# Patient Record
Sex: FEMALE | Race: WHITE | Employment: FULL TIME | URBAN - METROPOLITAN AREA
[De-identification: names, ages, dates, MRNs, and addresses within clinical notes are randomized per-mention and may not be internally consistent; named-entity substitution may affect disease eponyms.]

---

## 2020-06-09 ENCOUNTER — PREP FOR PROCEDURE (OUTPATIENT)
Dept: OBGYN | Age: 50
End: 2020-06-09

## 2020-06-09 RX ORDER — SODIUM CHLORIDE 0.9 % (FLUSH) 0.9 %
10 SYRINGE (ML) INJECTION PRN
Status: CANCELLED | OUTPATIENT
Start: 2020-06-09

## 2020-06-09 RX ORDER — SODIUM CHLORIDE 0.9 % (FLUSH) 0.9 %
10 SYRINGE (ML) INJECTION EVERY 12 HOURS SCHEDULED
Status: CANCELLED | OUTPATIENT
Start: 2020-06-09

## 2020-06-09 NOTE — H&P (VIEW-ONLY)
100 mcg-25 mcg/dose powder for inhalation inhale 1 puff by inhalation route  every day at the same time each day //   Y   ibuprofen 800 mg tablet take 1 tablet by oral route 2 times every day with food as needed //   Y   Flonase Allergy Relief 50 mcg/actuation nasal spray,suspension spray 1 - 2 spray by intranasal route  every day in each nostril as needed as needed 04/10/2020   N   Claritin 10 mg tablet take 1 tablet by oral route  every day as needed //   Y       Allergies:  Ingredient Reaction (Severity) Medication Name Comment   AMOXICILLIN TRIHYDRATE  Augmentin    POTASSIUM CLAVULANATE  Augmentin        Family History  (Detailed)  Relationship Family Member Name  Age at Death Condition Onset Age Cause of Death   Grandmother    Heart disease  N   Grandmother    Cancer, lung  N   Mother    Hypertension  N   Mother    High Cholesterol  N   Mother    Heart disease  N   Sister    Allergies  N   Sister    Migraines  N     Social History:  (Detailed)  Preferred language is English. 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 Tobacco use status: Never smoked tobacco.  Smoking status: Never smoker. VAPING USE  Screened for vaping? Yes  Status: Not a current user    TOBACCO/VAPING EXPOSURE  There is passive smoke exposure. ALCOHOL  There is no history of alcohol use. CAFFEINE  The patient uses caffeine: tea and soda. - 16 oz a day. LIFESTYLE  does not exercise activity level. DIET  Fair. The patient reports there are animals in the home. PETS   dogs. The patient cleans up after the animal(s). SLEEP PATTERNS  Patient has no changes to sleep patterns. Uatsdin/SPIRITUAL  Practices Denominational. Doesn't have spiritual beliefs. required 0     SDOH    Intervention not needed 0       Assessment/Plan  # Detail Type Description    1. Assessment Abnormal uterine bleeding (AUB) (N93.9). Impression Failing ablation vs submucosal fibroid and cannot r/o endometrial hyperplasia with thickened endometrium. Likely perimenopausal small ovarian cysts. Recommend tissue sample and reviewed EMB vs hysteroscopy and can also evaluate/remove fibroid and pt prefers hysteroscopy. Pending result can consider hormonal treatment until menopause. Risks of bleeding, infection, damage to surrounding organs, perforation, DVT, ileus reviewed. Questions answered and postop instructions reviewed. .         2. Assessment Body mass index (BMI) 45.0-49.9, adult (B62.02). Medications (Added, Continued or Stopped today):  Start Date Medication Directions PRN Status PRN Reason Instruction Stop Date    Breo Ellipta 100 mcg-25 mcg/dose powder for inhalation inhale 1 puff by inhalation route  every day at the same time each day N       Claritin 10 mg tablet take 1 tablet by oral route  every day as needed Y      04/10/2020 Flonase Allergy Relief 50 mcg/actuation nasal spray,suspension spray 1 - 2 spray by intranasal route  every day in each nostril as needed as needed Y       hydrochlorothiazide 12.5 mg capsule take 1 capsule by oral route  every day N       ibuprofen 800 mg tablet take 1 tablet by oral route 2 times every day with food as needed Y      10/02/2014 venlafaxine ER 37.5 mg capsule,extended release 24 hr take 1 capsule by oral route  every day with food N            Active Patient Care Team Members    Name Contact Agency Type Support Role Relationship Active Date Inactive Date Specialty   .  Non HSO Provider   Patient provider PCP      Luther Sung    Spouse          Provider:   Wilbur Johnson MD, Jessica Bowman  06/09/2020 4:15 PM   Document generated by:  Felicita Talbert 06/09/2020 04:15 PM  [facility]  [address]

## 2020-06-09 NOTE — PROGRESS NOTES
Obstructive Sleep Apnea (JULIET) Screening     Patient:  Hadye Quach    YOB: 1970      Medical Record #:  [id number]                     Date:  6/9/2020     1. Are you a loud and/or regular snorer? [x]  Yes       [] No    2. Have you been observed to gasp or stop breathing during sleep? []  Yes       [x] No    3. Do you feel tired or groggy upon awakening or do you awaken with a headache? [x]  Yes       [] No    4. Are you often tired or fatigued during the wake time hours? [x]  Yes       [] No    5. Do you fall asleep sitting, reading, watching TV or driving? []  Yes       [x] No    6. Do you often have problems with memory or concentration? []  Yes       [x] No    **If patient's score is ? 3 they are considered high risk for JULIET. An Anesthesia provider will evaluate the patient and develop a plan of care the day of surgery. Note:  If the patient's BMI is more than 35 kg m¯² , has neck circumference > 40 cm, and/or high blood pressure the risk is greater (© American Sleep Apnea Association, 2006).

## 2020-06-12 ENCOUNTER — OFFICE VISIT (OUTPATIENT)
Dept: PRIMARY CARE CLINIC | Age: 50
End: 2020-06-12

## 2020-06-13 LAB
SARS-COV-2: NOT DETECTED
SOURCE: NORMAL

## 2020-06-15 ENCOUNTER — ANESTHESIA EVENT (OUTPATIENT)
Dept: OPERATING ROOM | Age: 50
End: 2020-06-15
Payer: COMMERCIAL

## 2020-06-16 ENCOUNTER — HOSPITAL ENCOUNTER (OUTPATIENT)
Age: 50
Setting detail: OUTPATIENT SURGERY
Discharge: HOME | End: 2020-06-16
Attending: OBSTETRICS & GYNECOLOGY | Admitting: OBSTETRICS & GYNECOLOGY
Payer: COMMERCIAL

## 2020-06-16 ENCOUNTER — ANESTHESIA (OUTPATIENT)
Dept: OPERATING ROOM | Age: 50
End: 2020-06-16
Payer: COMMERCIAL

## 2020-06-16 VITALS
DIASTOLIC BLOOD PRESSURE: 74 MMHG | SYSTOLIC BLOOD PRESSURE: 124 MMHG | HEART RATE: 56 BPM | HEIGHT: 62 IN | TEMPERATURE: 97.2 F | RESPIRATION RATE: 14 BRPM | WEIGHT: 244 LBS | OXYGEN SATURATION: 99 % | BODY MASS INDEX: 44.9 KG/M2

## 2020-06-16 VITALS
DIASTOLIC BLOOD PRESSURE: 55 MMHG | RESPIRATION RATE: 17 BRPM | SYSTOLIC BLOOD PRESSURE: 102 MMHG | OXYGEN SATURATION: 99 %

## 2020-06-16 LAB
HCT VFR BLD CALC: 39.2 % (ref 36–48)
HEMOGLOBIN: 13.1 G/DL (ref 12–16)
MCH RBC QN AUTO: 26.7 PG (ref 26–34)
MCHC RBC AUTO-ENTMCNC: 33.4 G/DL (ref 31–36)
MCV RBC AUTO: 79.9 FL (ref 80–100)
PDW BLD-RTO: 14.5 % (ref 12.4–15.4)
PLATELET # BLD: 224 K/UL (ref 135–450)
PMV BLD AUTO: 8.4 FL (ref 5–10.5)
PREGNANCY, URINE: NEGATIVE
RBC # BLD: 4.91 M/UL (ref 4–5.2)
WBC # BLD: 8.5 K/UL (ref 4–11)

## 2020-06-16 PROCEDURE — 84703 CHORIONIC GONADOTROPIN ASSAY: CPT

## 2020-06-16 PROCEDURE — 85027 COMPLETE CBC AUTOMATED: CPT

## 2020-06-16 PROCEDURE — 7100000010 HC PHASE II RECOVERY - FIRST 15 MIN: Performed by: OBSTETRICS & GYNECOLOGY

## 2020-06-16 PROCEDURE — 3700000000 HC ANESTHESIA ATTENDED CARE: Performed by: OBSTETRICS & GYNECOLOGY

## 2020-06-16 PROCEDURE — 3600000013 HC SURGERY LEVEL 3 ADDTL 15MIN: Performed by: OBSTETRICS & GYNECOLOGY

## 2020-06-16 PROCEDURE — 88305 TISSUE EXAM BY PATHOLOGIST: CPT

## 2020-06-16 PROCEDURE — 7100000011 HC PHASE II RECOVERY - ADDTL 15 MIN: Performed by: OBSTETRICS & GYNECOLOGY

## 2020-06-16 PROCEDURE — 6360000002 HC RX W HCPCS: Performed by: NURSE ANESTHETIST, CERTIFIED REGISTERED

## 2020-06-16 PROCEDURE — 3600000003 HC SURGERY LEVEL 3 BASE: Performed by: OBSTETRICS & GYNECOLOGY

## 2020-06-16 PROCEDURE — 7100000000 HC PACU RECOVERY - FIRST 15 MIN: Performed by: OBSTETRICS & GYNECOLOGY

## 2020-06-16 PROCEDURE — 2580000003 HC RX 258: Performed by: OBSTETRICS & GYNECOLOGY

## 2020-06-16 PROCEDURE — 2500000003 HC RX 250 WO HCPCS: Performed by: NURSE ANESTHETIST, CERTIFIED REGISTERED

## 2020-06-16 PROCEDURE — 3700000001 HC ADD 15 MINUTES (ANESTHESIA): Performed by: OBSTETRICS & GYNECOLOGY

## 2020-06-16 PROCEDURE — 2720000010 HC SURG SUPPLY STERILE: Performed by: OBSTETRICS & GYNECOLOGY

## 2020-06-16 PROCEDURE — 2580000003 HC RX 258: Performed by: ANESTHESIOLOGY

## 2020-06-16 PROCEDURE — 2709999900 HC NON-CHARGEABLE SUPPLY: Performed by: OBSTETRICS & GYNECOLOGY

## 2020-06-16 RX ORDER — ONDANSETRON 2 MG/ML
INJECTION INTRAMUSCULAR; INTRAVENOUS PRN
Status: DISCONTINUED | OUTPATIENT
Start: 2020-06-16 | End: 2020-06-16 | Stop reason: SDUPTHER

## 2020-06-16 RX ORDER — OXYCODONE HYDROCHLORIDE AND ACETAMINOPHEN 5; 325 MG/1; MG/1
1 TABLET ORAL PRN
Status: DISCONTINUED | OUTPATIENT
Start: 2020-06-16 | End: 2020-06-16 | Stop reason: HOSPADM

## 2020-06-16 RX ORDER — LIDOCAINE HYDROCHLORIDE 10 MG/ML
0.3 INJECTION, SOLUTION EPIDURAL; INFILTRATION; INTRACAUDAL; PERINEURAL
Status: DISCONTINUED | OUTPATIENT
Start: 2020-06-16 | End: 2020-06-16 | Stop reason: HOSPADM

## 2020-06-16 RX ORDER — OXYCODONE HYDROCHLORIDE AND ACETAMINOPHEN 5; 325 MG/1; MG/1
2 TABLET ORAL PRN
Status: DISCONTINUED | OUTPATIENT
Start: 2020-06-16 | End: 2020-06-16 | Stop reason: HOSPADM

## 2020-06-16 RX ORDER — DEXAMETHASONE SODIUM PHOSPHATE 4 MG/ML
INJECTION, SOLUTION INTRA-ARTICULAR; INTRALESIONAL; INTRAMUSCULAR; INTRAVENOUS; SOFT TISSUE PRN
Status: DISCONTINUED | OUTPATIENT
Start: 2020-06-16 | End: 2020-06-16 | Stop reason: SDUPTHER

## 2020-06-16 RX ORDER — SODIUM CHLORIDE 0.9 % (FLUSH) 0.9 %
10 SYRINGE (ML) INJECTION PRN
Status: DISCONTINUED | OUTPATIENT
Start: 2020-06-16 | End: 2020-06-16 | Stop reason: HOSPADM

## 2020-06-16 RX ORDER — LIDOCAINE HYDROCHLORIDE 20 MG/ML
INJECTION, SOLUTION INFILTRATION; PERINEURAL PRN
Status: DISCONTINUED | OUTPATIENT
Start: 2020-06-16 | End: 2020-06-16 | Stop reason: SDUPTHER

## 2020-06-16 RX ORDER — ONDANSETRON 2 MG/ML
4 INJECTION INTRAMUSCULAR; INTRAVENOUS EVERY 30 MIN PRN
Status: DISCONTINUED | OUTPATIENT
Start: 2020-06-16 | End: 2020-06-16 | Stop reason: HOSPADM

## 2020-06-16 RX ORDER — SODIUM CHLORIDE, SODIUM LACTATE, POTASSIUM CHLORIDE, AND CALCIUM CHLORIDE .6; .31; .03; .02 G/100ML; G/100ML; G/100ML; G/100ML
IRRIGANT IRRIGATION PRN
Status: DISCONTINUED | OUTPATIENT
Start: 2020-06-16 | End: 2020-06-16 | Stop reason: ALTCHOICE

## 2020-06-16 RX ORDER — HYDRALAZINE HYDROCHLORIDE 20 MG/ML
5 INJECTION INTRAMUSCULAR; INTRAVENOUS EVERY 30 MIN PRN
Status: DISCONTINUED | OUTPATIENT
Start: 2020-06-16 | End: 2020-06-16 | Stop reason: HOSPADM

## 2020-06-16 RX ORDER — PROPOFOL 10 MG/ML
INJECTION, EMULSION INTRAVENOUS PRN
Status: DISCONTINUED | OUTPATIENT
Start: 2020-06-16 | End: 2020-06-16 | Stop reason: SDUPTHER

## 2020-06-16 RX ORDER — DIPHENHYDRAMINE HYDROCHLORIDE 50 MG/ML
6.25 INJECTION INTRAMUSCULAR; INTRAVENOUS
Status: DISCONTINUED | OUTPATIENT
Start: 2020-06-16 | End: 2020-06-16 | Stop reason: HOSPADM

## 2020-06-16 RX ORDER — LABETALOL HYDROCHLORIDE 5 MG/ML
5 INJECTION, SOLUTION INTRAVENOUS
Status: DISCONTINUED | OUTPATIENT
Start: 2020-06-16 | End: 2020-06-16 | Stop reason: HOSPADM

## 2020-06-16 RX ORDER — KETOROLAC TROMETHAMINE 30 MG/ML
INJECTION, SOLUTION INTRAMUSCULAR; INTRAVENOUS PRN
Status: DISCONTINUED | OUTPATIENT
Start: 2020-06-16 | End: 2020-06-16 | Stop reason: SDUPTHER

## 2020-06-16 RX ORDER — SODIUM CHLORIDE 0.9 % (FLUSH) 0.9 %
10 SYRINGE (ML) INJECTION EVERY 12 HOURS SCHEDULED
Status: DISCONTINUED | OUTPATIENT
Start: 2020-06-16 | End: 2020-06-16 | Stop reason: HOSPADM

## 2020-06-16 RX ORDER — SODIUM CHLORIDE, SODIUM LACTATE, POTASSIUM CHLORIDE, CALCIUM CHLORIDE 600; 310; 30; 20 MG/100ML; MG/100ML; MG/100ML; MG/100ML
INJECTION, SOLUTION INTRAVENOUS CONTINUOUS
Status: DISCONTINUED | OUTPATIENT
Start: 2020-06-16 | End: 2020-06-16 | Stop reason: HOSPADM

## 2020-06-16 RX ORDER — MEPERIDINE HYDROCHLORIDE 50 MG/ML
12.5 INJECTION INTRAMUSCULAR; INTRAVENOUS; SUBCUTANEOUS EVERY 5 MIN PRN
Status: DISCONTINUED | OUTPATIENT
Start: 2020-06-16 | End: 2020-06-16 | Stop reason: HOSPADM

## 2020-06-16 RX ORDER — FENTANYL CITRATE 50 UG/ML
INJECTION, SOLUTION INTRAMUSCULAR; INTRAVENOUS PRN
Status: DISCONTINUED | OUTPATIENT
Start: 2020-06-16 | End: 2020-06-16 | Stop reason: SDUPTHER

## 2020-06-16 RX ORDER — MIDAZOLAM HYDROCHLORIDE 1 MG/ML
INJECTION INTRAMUSCULAR; INTRAVENOUS PRN
Status: DISCONTINUED | OUTPATIENT
Start: 2020-06-16 | End: 2020-06-16 | Stop reason: SDUPTHER

## 2020-06-16 RX ADMIN — DEXAMETHASONE SODIUM PHOSPHATE 8 MG: 4 INJECTION, SOLUTION INTRAMUSCULAR; INTRAVENOUS at 11:21

## 2020-06-16 RX ADMIN — MIDAZOLAM HYDROCHLORIDE 2 MG: 2 INJECTION, SOLUTION INTRAMUSCULAR; INTRAVENOUS at 11:12

## 2020-06-16 RX ADMIN — LIDOCAINE HYDROCHLORIDE 60 MG: 20 INJECTION, SOLUTION INFILTRATION; PERINEURAL at 11:15

## 2020-06-16 RX ADMIN — FENTANYL CITRATE 50 MCG: 50 INJECTION INTRAMUSCULAR; INTRAVENOUS at 11:15

## 2020-06-16 RX ADMIN — PROPOFOL 200 MG: 10 INJECTION, EMULSION INTRAVENOUS at 11:15

## 2020-06-16 RX ADMIN — FENTANYL CITRATE 25 MCG: 50 INJECTION INTRAMUSCULAR; INTRAVENOUS at 11:26

## 2020-06-16 RX ADMIN — KETOROLAC TROMETHAMINE 30 MG: 30 INJECTION, SOLUTION INTRAMUSCULAR; INTRAVENOUS at 11:36

## 2020-06-16 RX ADMIN — ONDANSETRON 4 MG: 2 INJECTION INTRAMUSCULAR; INTRAVENOUS at 11:21

## 2020-06-16 RX ADMIN — FENTANYL CITRATE 25 MCG: 50 INJECTION INTRAMUSCULAR; INTRAVENOUS at 11:33

## 2020-06-16 RX ADMIN — SODIUM CHLORIDE, POTASSIUM CHLORIDE, SODIUM LACTATE AND CALCIUM CHLORIDE: 600; 310; 30; 20 INJECTION, SOLUTION INTRAVENOUS at 10:32

## 2020-06-16 ASSESSMENT — PAIN - FUNCTIONAL ASSESSMENT: PAIN_FUNCTIONAL_ASSESSMENT: 0-10

## 2020-06-16 ASSESSMENT — PULMONARY FUNCTION TESTS
PIF_VALUE: 17
PIF_VALUE: 11
PIF_VALUE: 2
PIF_VALUE: 11
PIF_VALUE: 10
PIF_VALUE: 15
PIF_VALUE: 11
PIF_VALUE: 1
PIF_VALUE: 11
PIF_VALUE: 15
PIF_VALUE: 1
PIF_VALUE: 10
PIF_VALUE: 15
PIF_VALUE: 11
PIF_VALUE: 11
PIF_VALUE: 10
PIF_VALUE: 11
PIF_VALUE: 19
PIF_VALUE: 14
PIF_VALUE: 11
PIF_VALUE: 11
PIF_VALUE: 15
PIF_VALUE: 5
PIF_VALUE: 16
PIF_VALUE: 11
PIF_VALUE: 11
PIF_VALUE: 15
PIF_VALUE: 11
PIF_VALUE: 1

## 2020-06-16 ASSESSMENT — PAIN SCALES - GENERAL
PAINLEVEL_OUTOF10: 0

## 2020-06-16 NOTE — INTERVAL H&P NOTE
Update History & Physical    The patient's History and Physical of June 9, 2020 was reviewed with the patient and I examined the patient. There was no change. The surgical site was confirmed by the patient and me. Plan: The risks, benefits, expected outcome, and alternative to the recommended procedure have been discussed with the patient. Patient understands and wants to proceed with the procedure.      Electronically signed by Dennise Britt MD on 6/16/2020 at 10:15 AM

## 2020-06-16 NOTE — OP NOTE
the fibroid and polyps. The MyoSure REACH was used to  completely excise the submucosal fibroid as well as the polyps and  global sampling of the endometrium. The hysteroscope was removed and  endometrial curettings were performed. The uterus sounded to 10 cm. The instruments were then removed with good hemostasis. The patient  tolerated the procedure well. Sponge, lap, and needle counts were  correct x2. I performed the procedure.         Alex Mix MD    D: 06/16/2020 11:49:50       T: 06/16/2020 13:49:36     [email or web address]  Job#: [id number]     Doc#: [id number]    CC:

## 2020-08-17 ENCOUNTER — PREP FOR PROCEDURE (OUTPATIENT)
Dept: OBGYN | Age: 50
End: 2020-08-17

## 2020-08-17 ENCOUNTER — ANESTHESIA EVENT (OUTPATIENT)
Dept: OPERATING ROOM | Age: 50
End: 2020-08-17
Payer: COMMERCIAL

## 2020-08-17 RX ORDER — SODIUM CHLORIDE 0.9 % (FLUSH) 0.9 %
10 SYRINGE (ML) INJECTION EVERY 12 HOURS SCHEDULED
Status: CANCELLED | OUTPATIENT
Start: 2020-08-17

## 2020-08-17 RX ORDER — SODIUM CHLORIDE 0.9 % (FLUSH) 0.9 %
10 SYRINGE (ML) INJECTION PRN
Status: CANCELLED | OUTPATIENT
Start: 2020-08-17

## 2020-08-17 NOTE — H&P (VIEW-ONLY)
>      PATIENT:  Kayla Mcarthur  YOB: 1970  DATE:   2020 3:45 PM   VISIT TYPE: Office Visit - GYN        This 48year old female presents for Hyperplasia. History of Present Illness:  1. Hyperplasia   Pt had ablation in  and was having rare light bleeding but 2-3 months ago began to have persistent bleeding and had some heavy menstrual like days. Has had some recent heavy bleeding. US  showed EMS 16 mm and multiple small fibroids, one 1.2 cm likely submucosal, bilateral small ovarian cysts. Pt states hot flashes have increased worse at night. Mild cramping. Underwent Hysteroscopy myomectomy  with hyperplasia without atypia on final pathology. Screening Tools  Other Screenings:  Encounter Date Performed Date Instrument Score Severity/Interpretation MDD Classification   2020 Patient Health Questionnaire (PHQ-2) 0 Further testing is not required    2020 CAGE-AID Alcohol and Drug Screening 0 None    2020 SDOH 0 Intervention not needed      CAGE ALCOHOL SCREENING TEST      Felt need to cut down drinking? No       Ever felt annoyed by criticism of drinking? No       Had guilty feelings about drinking? No       Ever take morning eye-opener? No       Performed Date:       Score: 0        PAST MEDICAL/SURGICAL HISTORY   (Detailed)    Disease/disorder Onset Date Management Date Comments    2018 back surgery     Back surgery 2018      Lasik 2002      episodes of fainting       Abnormal uterine bleeding  Hysteroscopy myomectomy, D&C 2020    Gallbladder disease       heavy menses  ablation-      Hypertension         GYNECOLOGIC HISTORY:  Patient is perimenopausal. Last menses was 2020. Menarche:  15 y.o. (onset)  Patient has not had a hysterectomy. Date of last PAP: 2016. Date of last mammogram: 2018. OBSTETRIC HISTORY:  Not currently pregnant.    :3.      PARITY: Term:3.  Pre-Term: 0. : 0. Living: 3. SVDs: 3. Medications (active prior to today)  Medication Name Sig Description Start Date Stop Date Refilled Rx Elsewhere   venlafaxine ER 37.5 mg capsule,extended release 24 hr take 1 capsule by oral route  every day with food 10/02/2014   N   hydrochlorothiazide 12.5 mg capsule take 1 capsule by oral route  every day //   Y   Breo Ellipta 100 mcg-25 mcg/dose powder for inhalation inhale 1 puff by inhalation route  every day at the same time each day //   Y   ibuprofen 800 mg tablet take 1 tablet by oral route 2 times every day with food as needed //   Y   Flonase Allergy Relief 50 mcg/actuation nasal spray,suspension spray 1 - 2 spray by intranasal route  every day in each nostril as needed as needed 04/10/2020   N   Claritin 10 mg tablet take 1 tablet by oral route  every day as needed //   Y     Medication Reconciliation  Medications reconciled today.   Medication Reviewed  Adherence Medication Name Sig Desc Elsewhere Status   taking as directed Breo Ellipta 100 mcg-25 mcg/dose powder for inhalation inhale 1 puff by inhalation route  every day at the same time each day Y Verified   taking as directed Claritin 10 mg tablet take 1 tablet by oral route  every day as needed Y Verified   taking as directed Flonase Allergy Relief 50 mcg/actuation nasal spray,suspension spray 1 - 2 spray by intranasal route  every day in each nostril as needed as needed N Verified   taking as directed hydrochlorothiazide 12.5 mg capsule take 1 capsule by oral route  every day Y Verified   prn use ibuprofen 800 mg tablet take 1 tablet by oral route 2 times every day with food as needed Y Verified   taken as directed venlafaxine ER 37.5 mg capsule,extended release 24 hr take 1 capsule by oral route  every day with food N Verified     Allergies:  Ingredient Reaction (Severity) Medication Name Comment   AMOXICILLIN TRIHYDRATE  Augmentin    POTASSIUM CLAVULANATE  Augmentin Family History  (Detailed)  Relationship Family Member Name  Age at Death Condition Onset Age Cause of Death   Grandmother    Heart disease  N   Grandmother    Cancer, lung  N   Mother    Hypertension  N   Mother    High Cholesterol  N   Mother    Heart disease  N   Sister    Allergies  N   Sister    Migraines  N     Social History:  (Detailed)  Tobacco use reviewed. Preferred language is [place]. 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 Tobacco use status: Never smoked tobacco.  Smoking status: Never smoker. SMOKING STATUS  Type Smoking Status Usage Per Day Years Used Pack Years Total Pack Years    Never smoker         VAPING USE  Screened for vaping? Yes  Status: Not a current user    TOBACCO/VAPING EXPOSURE  There is passive smoke exposure. ALCOHOL  There is no history of alcohol use. CAFFEINE  The patient uses caffeine: tea and soda. - 16 oz a day. LIFESTYLE  does not exercise activity level. DIET  Fair. The patient reports there are animals in the home. PETS   dogs. The patient cleans up after the animal(s). SLEEP PATTERNS  Patient has no changes to sleep patterns. Yazdanism/SPIRITUAL  Practices Confucianism. Doesn't have spiritual beliefs. Mandaen/spirituality is an important part of the patient's life.  EXPERIENCE  Patient has no  experience. Review of Systems  System Neg/Pos Details   Constitutional Negative Chills/rigors, Fever and Generalized weakness. ENMT Negative Dysphagia and Epistaxis. Eyes Negative Burning eyes, Eye discharge, Eye redness and Vision changes. Respiratory Negative Dyspnea, Hemoptysis and Wheezing. Cardio Negative Chest pain and Irregular heartbeat/palpitations. GI Negative Abdominal pain, Change in bowel habits and Nausea.  Negative Change in urine color, Dysuria and Hematuria. Endocrine Negative Change in sleep/awake pattern. Neuro Negative Aphasia, Dizziness and Gait disturbance. Integumentary Negative Photosensitivity and Rash. MS Negative Bone/joint symptoms, Muscle weakness and Numbness in extremity. Hema/Lymph Negative Easy bleeding and Lymphadenopathy. Reproductive Positive Menarche age (Menarche age was 15), The patient is victoria-menopausal.   Reproductive Negative Breast discharge, Breast lumps and Breast pain. Vital Signs     Last menses was 07/30/2020. Time BP mm/Hg Pulse /min Resp /min Temp F Ht ft Ht in Ht cm Wt lb Wt kg BMI kg/m2 BSA m2 O2 Sat%   4:05 /76 77 14 97.4 5.0 1.50 156.21 246.60 111. 856 45.84 2.20 98     Measured By  Time Measured by   4:05 PM Hetal Mccallum MA     Physical Exam  Exam Findings Details   Constitutional Normal Well developed. Respiratory Normal Auscultation - Normal.   Cardiovascular Normal Regular rhythm. No murmurs, gallops, or rubs. Abdomen Normal Anterior palpation -  No rebound. No abdominal tenderness. No hernia. Skin Normal Inspection - Normal.   Psychiatric Normal Oriented to time, place, person and situation. Appropriate mood and affect. Completed Orders (This Visit)  Order Details Reason Side Interpretation Result Initial Treatment Date Region   Giving encouragement to exercise          Lifestyle education regarding diet          Patient Health Questionnaire (PHQ-2)    Further testing is not required 0     CAGE-AID Alcohol and Drug Screening    None 0     SDOH    Intervention not needed 0       Assessment/Plan  # Detail Type Description    1. Assessment Endometrial hyperplasia (N85.00). Impression Diagnosis and progression reviewed and discussed hormonal treatment options vs hysterectomy and pt desires hysterectomy.  Ovarian retention vs removal benefits and risks discussed and pt would like to retain. Will get record of medical clearance with PCP. Risks of bleeding, infection, damage to surrounding organs, DVT, ileus reviewed. Questions answered and postop instructions reviewed. .         2. Assessment Body mass index (BMI) 45.0-49.9, adult (S10.73). 3. Assessment Dietary counseling and surveillance (Z71.3). Plan Orders Today's instructions / counseling include(s) Lifestyle education regarding diet. Giving encouragement to exercise                        Medications (Added, Continued or Stopped today):  Start Date Medication Directions PRN Status PRN Reason Instruction Stop Date    Breo Ellipta 100 mcg-25 mcg/dose powder for inhalation inhale 1 puff by inhalation route  every day at the same time each day N       Claritin 10 mg tablet take 1 tablet by oral route  every day as needed Y      04/10/2020 Flonase Allergy Relief 50 mcg/actuation nasal spray,suspension spray 1 - 2 spray by intranasal route  every day in each nostril as needed as needed Y       hydrochlorothiazide 12.5 mg capsule take 1 capsule by oral route  every day N       ibuprofen 800 mg tablet take 1 tablet by oral route 2 times every day with food as needed Y      10/02/2014 venlafaxine ER 37.5 mg capsule,extended release 24 hr take 1 capsule by oral route  every day with food N            The patient was checked out at 4:55 PM by Romario Juan MA. Active Patient Care Team Members    Name Contact Agency Type Support Role Relationship Active Date Inactive Date Specialty   .  Non HSO Provider   Patient provider PCP      Feliciano Lin    Spouse          Provider:   Krista Arenas MD, Thomas Ferrara  08/17/2020 5:20 PM   Document generated by:  Shannen Servin 08/17/2020 05:20 PM  [facility]  [address]   Phone: [phone]  Fax: [phone]      Electronically signed by Shannen Servin MD on 08/17/2020 05:22 PM

## 2020-08-18 ENCOUNTER — HOSPITAL ENCOUNTER (OUTPATIENT)
Age: 50
Discharge: HOME | End: 2020-08-18
Payer: COMMERCIAL

## 2020-08-18 PROCEDURE — U0003 INFECTIOUS AGENT DETECTION BY NUCLEIC ACID (DNA OR RNA); SEVERE ACUTE RESPIRATORY SYNDROME CORONAVIRUS 2 (SARS-COV-2) (CORONAVIRUS DISEASE [COVID-19]), AMPLIFIED PROBE TECHNIQUE, MAKING USE OF HIGH THROUGHPUT TECHNOLOGIES AS DESCRIBED BY CMS-2020-01-R: HCPCS

## 2020-08-18 NOTE — PROGRESS NOTES
PAT completed with patient orders in Epic placed by MD, Covid testing on 8/18/2020, pt aware of 0600 arrival time NPO after midnight the night prior to surgery may take medications with sip of water aware she needs to bring  with her and denies any current illness with herself or within the home. [place] Brandie

## 2020-08-18 NOTE — PRE-PROCEDURE INSTRUCTIONS
.1.  Do not eat or drink anything after 12 midnight prior to surgery. This includes no water, chewing gum or mints. 2.  Take the following pills with a small sip of water on the morning of surgery 8/24/2020.  3.  Aspirin, Ibuprofen, Advil, Naproxen, Vitamin E and other Anti-inflammatory products should be stopped for 5 days before surgery or as directed by your physician. 4.  Check with your doctor regarding stopping Plavix, Coumadin, Lovenox, Fragmin or other blood thinners. 5.  Do not smoke and do not drink alcoholic beverages 24 hours prior to surgery. This includes NA Beer. 6.  You may brush your teeth and gargle the morning of surgery. DO NOT SWALLOW WATER.  7.  You MUST make arrangements for a responsible adult to take you home after your surgery. You will not be allowed to leave alone or drive yourself home. It is strongly suggested someone stay with you the first 24 hours. Your surgery will be cancelled if you do not have a ride home. 8.  A parent/legal guardian must accompany a child scheduled for surgery and plan to stay at the hospital until the child is discharged. Please do not bring other children with you. 9.  Please wear simple, loose fitting clothing to the hospital.  Juan Porch not bring valuables ( money, credit cards, checkbooks, etc.)  Do not wear any makeup (including no eye makeup) or nail polish on your fingers or toes. 10.  Do not wear any jewelry or piercing on the day of surgery. All body piercing jewelry must be removed. 11.  If you have dentures, they will be removed before going to the OR; we will provide you a container. If you wear contact lenses or glasses, they will be removed; please bring a case for them. 12.  Please see your family doctor/pediatrician for a history & physical and/or concerning medications. Bring any test results/reports from your physician's office the day of surgery.     13.  Remember to bring Blood Bank Bracelet to the hospital on the day of

## 2020-08-19 LAB — SARS-COV-2, NAA: NOT DETECTED

## 2020-08-21 NOTE — ANESTHESIA PRE PROCEDURE
Department of Anesthesiology  Preprocedure Note       Name:  Dorothy Martin   Age:  48 y.o.  :  1970                                          MRN:  [id number]         Date:  2020      Surgeon: Iza Tobar):  Юлия Frank MD    Procedure:  ROBOTIC ASSISTED LAPAROSCOPIC HYSTERECTOMY    HPI:  The patient is a 80-year-old with previous endometrial ablation in . She was having rare light bleeding but 2-3 months ago began to have persistent bleeding and had some heavy menstrual like days. Has had some recent heavy bleeding. US  showed EMS 16 mm and multiple small fibroids, one 1.2 cm likely submucosal, bilateral small ovarian cysts. Pt states hot flashes have increased worse at night. Mild cramping. Underwent Hysteroscopy myomectomy  with hyperplasia without atypia on final pathology. Medications prior to admission:    Fluticasone Furoate-Vilanterol (BREO ELLIPTA IN) Inhale into the lungs daily   Fluticasone Propionate (FLONASE NA) 2 sprays by Nasal route daily   LYSINE PO Take by mouth as needed   hydrochlorothiazide (HYDRODIURIL) 25 MG tablet Take 25 mg by mouth daily    ibuprofen (ADVIL;MOTRIN) 800 MG tablet Take 800 mg by mouth as needed    venlafaxine (EFFEXOR) 37.5 MG tablet Take 37.5 mg by mouth daily.      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    Localized osteoarthrosis, lower leg M17.10     Past Medical History:     Asthma     Chest pain     non cardiac    Hypertension     Localized osteoarthrosis, lower leg 2016    Prolonged emergence from general anesthesia     Stress incontinence     Wears glasses     for driving at night     Past Surgical History:     BACK SURGERY      CHOLECYSTECTOMY      CYSTOSCOPY      DILATION AND CURETTAGE OF UTERUS N/A 2020 VIDEO HYSTEROSCOPY DILATATION AND CURETTAGE, POSSIBLE MYOSURE -JULIET=3- performed by Rosa Maria Akhtar MD at [address]  11/25/14    SKIN BIOPSY  8/29/2014    scalp lesion     Social History:    Tobacco Use    Smoking status: Never Smoker    Smokeless tobacco: Never Used   Substance Use Topics    Alcohol use: No     Alcohol/week: 0.0 standard drinks     Vital Signs (Current):    BP: 119/66  Pulse: 70    Resp: 18  SpO2: 96    Temp: 97 °F (36.1 °C)    Height: 5' 2.5\" (1.588 m)  (08/24/20)  Weight: 245 lb (111.1 kg)  (08/24/20)    BMI: 44.2            BP Readings from Last 3 Encounters:   06/16/20 (!) 102/55   06/16/20 124/74   05/09/16 145/75     NPO Status: >8 hrs                       BMI:   Wt Readings from Last 3 Encounters:   06/16/20 244 lb (110.7 kg)   05/09/16 250 lb (113.4 kg)   05/02/16 250 lb (113.4 kg)     Body mass index is 44.1 kg/m². CBC:    WBC 8.5 06/16/2020    HGB 13.1 06/16/2020    HCT 39.2 06/16/2020     06/16/2020     CMP:     10/05/2012    K 4.0 10/05/2012     10/05/2012    CO2 30 10/05/2012    BUN 15 10/05/2012    GFRAA >60 10/05/2012    GLUCOSE 102 10/05/2012    PROT 6.9 10/05/2012    CALCIUM 9.1 10/05/2012    BILITOT 0.90 10/05/2012    ALKPHOS 73 10/05/2012    AST 18 10/05/2012    ALT 19 17/20/0476     HCG (If Applicable): negative    COVID-19 Screening (If Applicable):     COVID19 NOT DETECTED 08/18/2020     Anesthesia Evaluation  Patient summary reviewed and Nursing notes reviewed no history of anesthetic complications:   Airway: Mallampati: II  TM distance: >3 FB   Neck ROM: full  Mouth opening: > = 3 FB Dental:          Pulmonary: breath sounds clear to auscultation  (+) asthma:     (-) recent URI, sleep apnea, wheezes and not a current smoker                          ROS comment: Patient is having an exam for JULIET   Cardiovascular:  Exercise tolerance: good (>4 METS),   (+) hypertension:, hyperlipidemia    (-) past MI, dysrhythmias,  angina and  VARGAS    ECG reviewed  Rhythm: regular  Rate: normal                 ROS comment: EKG:  3-  Intervals                 Axis            Rate:       67          P:            42  HI:       147          QRS:          39  QRSD:   94           T:            2  QT:       391                                      QTc:     413                                        Sinus rhythm     Neuro/Psych:      (-) seizures, TIA and CVA            ROS comment: L4/5 and T 12-L4 decompression GI/Hepatic/Renal:   (+) GERD: well controlled, morbid obesity     (-) hepatitis, liver disease and no renal disease       Endo/Other:    (+) : arthritis: OA., .    (-) diabetes mellitus, hypothyroidism               Abdominal:   (+) obese,         Vascular:     - DVT and PE. Anesthesia Plan      general     ASA 3     (TAP block if open procedure needed)  Induction: intravenous. MIPS: Prophylactic antiemetics administered. Anesthetic plan and risks discussed with patient. Plan discussed with CRNA.           Madison Leyva MD

## 2020-08-24 ENCOUNTER — HOSPITAL ENCOUNTER (OUTPATIENT)
Age: 50
Setting detail: OBSERVATION
Discharge: HOME | End: 2020-08-25
Attending: OBSTETRICS & GYNECOLOGY | Admitting: OBSTETRICS & GYNECOLOGY
Payer: COMMERCIAL

## 2020-08-24 ENCOUNTER — ANESTHESIA (OUTPATIENT)
Dept: OPERATING ROOM | Age: 50
End: 2020-08-24
Payer: COMMERCIAL

## 2020-08-24 VITALS
SYSTOLIC BLOOD PRESSURE: 117 MMHG | OXYGEN SATURATION: 100 % | DIASTOLIC BLOOD PRESSURE: 81 MMHG | RESPIRATION RATE: 21 BRPM

## 2020-08-24 PROBLEM — Z90.710 S/P HYSTERECTOMY: Status: ACTIVE | Noted: 2020-08-24

## 2020-08-24 LAB
ABO/RH: NORMAL
ANION GAP SERPL CALCULATED.3IONS-SCNC: 11 MMOL/L (ref 3–16)
ANION GAP SERPL CALCULATED.3IONS-SCNC: 7 MMOL/L (ref 3–16)
ANTIBODY SCREEN: NORMAL
BUN BLDV-MCNC: 14 MG/DL (ref 7–20)
BUN BLDV-MCNC: 14 MG/DL (ref 7–20)
CALCIUM SERPL-MCNC: 8.9 MG/DL (ref 8.3–10.6)
CALCIUM SERPL-MCNC: 9.4 MG/DL (ref 8.3–10.6)
CHLORIDE BLD-SCNC: 95 MMOL/L (ref 99–110)
CHLORIDE BLD-SCNC: 98 MMOL/L (ref 99–110)
CO2: 26 MMOL/L (ref 21–32)
CO2: 30 MMOL/L (ref 21–32)
CREAT SERPL-MCNC: 0.6 MG/DL (ref 0.6–1.1)
CREAT SERPL-MCNC: 0.6 MG/DL (ref 0.6–1.1)
GFR AFRICAN AMERICAN: >60
GFR AFRICAN AMERICAN: >60
GFR NON-AFRICAN AMERICAN: >60
GFR NON-AFRICAN AMERICAN: >60
GLUCOSE BLD-MCNC: 114 MG/DL (ref 70–99)
GLUCOSE BLD-MCNC: 149 MG/DL (ref 70–99)
HCT VFR BLD CALC: 41.1 % (ref 36–48)
HEMOGLOBIN: 13.4 G/DL (ref 12–16)
MAGNESIUM: 1.9 MG/DL (ref 1.8–2.4)
MCH RBC QN AUTO: 27.1 PG (ref 26–34)
MCHC RBC AUTO-ENTMCNC: 32.7 G/DL (ref 31–36)
MCV RBC AUTO: 82.8 FL (ref 80–100)
PDW BLD-RTO: 15.2 % (ref 12.4–15.4)
PLATELET # BLD: 177 K/UL (ref 135–450)
PMV BLD AUTO: 8.5 FL (ref 5–10.5)
POTASSIUM REFLEX MAGNESIUM: 2.9 MMOL/L (ref 3.5–5.1)
POTASSIUM SERPL-SCNC: 3.7 MMOL/L (ref 3.5–5.1)
PREGNANCY, URINE: NEGATIVE
RBC # BLD: 4.96 M/UL (ref 4–5.2)
SODIUM BLD-SCNC: 132 MMOL/L (ref 136–145)
SODIUM BLD-SCNC: 135 MMOL/L (ref 136–145)
WBC # BLD: 8.8 K/UL (ref 4–11)

## 2020-08-24 PROCEDURE — 80048 BASIC METABOLIC PNL TOTAL CA: CPT

## 2020-08-24 PROCEDURE — 2500000003 HC RX 250 WO HCPCS: Performed by: OBSTETRICS & GYNECOLOGY

## 2020-08-24 PROCEDURE — 84703 CHORIONIC GONADOTROPIN ASSAY: CPT

## 2020-08-24 PROCEDURE — G0378 HOSPITAL OBSERVATION PER HR: HCPCS

## 2020-08-24 PROCEDURE — 6370000000 HC RX 637 (ALT 250 FOR IP): Performed by: OBSTETRICS & GYNECOLOGY

## 2020-08-24 PROCEDURE — 6370000000 HC RX 637 (ALT 250 FOR IP): Performed by: ANESTHESIOLOGY

## 2020-08-24 PROCEDURE — 3600000009 HC SURGERY ROBOT BASE: Performed by: OBSTETRICS & GYNECOLOGY

## 2020-08-24 PROCEDURE — 36415 COLL VENOUS BLD VENIPUNCTURE: CPT

## 2020-08-24 PROCEDURE — 86850 RBC ANTIBODY SCREEN: CPT

## 2020-08-24 PROCEDURE — 3600000019 HC SURGERY ROBOT ADDTL 15MIN: Performed by: OBSTETRICS & GYNECOLOGY

## 2020-08-24 PROCEDURE — 2580000003 HC RX 258: Performed by: OBSTETRICS & GYNECOLOGY

## 2020-08-24 PROCEDURE — 7100000000 HC PACU RECOVERY - FIRST 15 MIN: Performed by: OBSTETRICS & GYNECOLOGY

## 2020-08-24 PROCEDURE — 6360000002 HC RX W HCPCS: Performed by: OBSTETRICS & GYNECOLOGY

## 2020-08-24 PROCEDURE — 2580000003 HC RX 258: Performed by: ANESTHESIOLOGY

## 2020-08-24 PROCEDURE — 83735 ASSAY OF MAGNESIUM: CPT

## 2020-08-24 PROCEDURE — 6360000002 HC RX W HCPCS: Performed by: NURSE ANESTHETIST, CERTIFIED REGISTERED

## 2020-08-24 PROCEDURE — 86901 BLOOD TYPING SEROLOGIC RH(D): CPT

## 2020-08-24 PROCEDURE — 7100000001 HC PACU RECOVERY - ADDTL 15 MIN: Performed by: OBSTETRICS & GYNECOLOGY

## 2020-08-24 PROCEDURE — 85027 COMPLETE CBC AUTOMATED: CPT

## 2020-08-24 PROCEDURE — 2500000003 HC RX 250 WO HCPCS: Performed by: NURSE ANESTHETIST, CERTIFIED REGISTERED

## 2020-08-24 PROCEDURE — 3700000001 HC ADD 15 MINUTES (ANESTHESIA): Performed by: OBSTETRICS & GYNECOLOGY

## 2020-08-24 PROCEDURE — 2709999900 HC NON-CHARGEABLE SUPPLY: Performed by: OBSTETRICS & GYNECOLOGY

## 2020-08-24 PROCEDURE — S2900 ROBOTIC SURGICAL SYSTEM: HCPCS | Performed by: OBSTETRICS & GYNECOLOGY

## 2020-08-24 PROCEDURE — 2720000010 HC SURG SUPPLY STERILE: Performed by: OBSTETRICS & GYNECOLOGY

## 2020-08-24 PROCEDURE — 6360000002 HC RX W HCPCS

## 2020-08-24 PROCEDURE — 88307 TISSUE EXAM BY PATHOLOGIST: CPT

## 2020-08-24 PROCEDURE — 3700000000 HC ANESTHESIA ATTENDED CARE: Performed by: OBSTETRICS & GYNECOLOGY

## 2020-08-24 PROCEDURE — 2500000003 HC RX 250 WO HCPCS: Performed by: ANESTHESIOLOGY

## 2020-08-24 PROCEDURE — 86900 BLOOD TYPING SEROLOGIC ABO: CPT

## 2020-08-24 PROCEDURE — 6370000000 HC RX 637 (ALT 250 FOR IP)

## 2020-08-24 RX ORDER — SODIUM CHLORIDE, SODIUM LACTATE, POTASSIUM CHLORIDE, CALCIUM CHLORIDE 600; 310; 30; 20 MG/100ML; MG/100ML; MG/100ML; MG/100ML
INJECTION, SOLUTION INTRAVENOUS CONTINUOUS
Status: DISCONTINUED | OUTPATIENT
Start: 2020-08-24 | End: 2020-08-24

## 2020-08-24 RX ORDER — MORPHINE SULFATE 4 MG/ML
4 INJECTION, SOLUTION INTRAMUSCULAR; INTRAVENOUS
Status: DISCONTINUED | OUTPATIENT
Start: 2020-08-24 | End: 2020-08-25

## 2020-08-24 RX ORDER — APREPITANT 40 MG/1
CAPSULE ORAL
Status: COMPLETED
Start: 2020-08-24 | End: 2020-08-24

## 2020-08-24 RX ORDER — SODIUM CHLORIDE, SODIUM LACTATE, POTASSIUM CHLORIDE, AND CALCIUM CHLORIDE .6; .31; .03; .02 G/100ML; G/100ML; G/100ML; G/100ML
IRRIGANT IRRIGATION PRN
Status: DISCONTINUED | OUTPATIENT
Start: 2020-08-24 | End: 2020-08-24 | Stop reason: ALTCHOICE

## 2020-08-24 RX ORDER — ONDANSETRON 2 MG/ML
INJECTION INTRAMUSCULAR; INTRAVENOUS PRN
Status: DISCONTINUED | OUTPATIENT
Start: 2020-08-24 | End: 2020-08-24 | Stop reason: SDUPTHER

## 2020-08-24 RX ORDER — SCOLOPAMINE TRANSDERMAL SYSTEM 1 MG/1
1 PATCH, EXTENDED RELEASE TRANSDERMAL
Status: DISCONTINUED | OUTPATIENT
Start: 2020-08-24 | End: 2020-08-25 | Stop reason: HOSPADM

## 2020-08-24 RX ORDER — LIDOCAINE HYDROCHLORIDE 20 MG/ML
INJECTION, SOLUTION INFILTRATION; PERINEURAL CONTINUOUS PRN
Status: DISCONTINUED | OUTPATIENT
Start: 2020-08-24 | End: 2020-08-24 | Stop reason: SDUPTHER

## 2020-08-24 RX ORDER — APREPITANT 40 MG/1
40 CAPSULE ORAL ONCE
Status: COMPLETED | OUTPATIENT
Start: 2020-08-24 | End: 2020-08-24

## 2020-08-24 RX ORDER — DEXAMETHASONE SODIUM PHOSPHATE 4 MG/ML
INJECTION, SOLUTION INTRA-ARTICULAR; INTRALESIONAL; INTRAMUSCULAR; INTRAVENOUS; SOFT TISSUE PRN
Status: DISCONTINUED | OUTPATIENT
Start: 2020-08-24 | End: 2020-08-24 | Stop reason: SDUPTHER

## 2020-08-24 RX ORDER — BUPIVACAINE HYDROCHLORIDE 5 MG/ML
INJECTION, SOLUTION EPIDURAL; INTRACAUDAL PRN
Status: DISCONTINUED | OUTPATIENT
Start: 2020-08-24 | End: 2020-08-24 | Stop reason: ALTCHOICE

## 2020-08-24 RX ORDER — LIDOCAINE HYDROCHLORIDE 20 MG/ML
INJECTION, SOLUTION INFILTRATION; PERINEURAL PRN
Status: DISCONTINUED | OUTPATIENT
Start: 2020-08-24 | End: 2020-08-24 | Stop reason: SDUPTHER

## 2020-08-24 RX ORDER — MORPHINE SULFATE 2 MG/ML
2 INJECTION, SOLUTION INTRAMUSCULAR; INTRAVENOUS
Status: DISCONTINUED | OUTPATIENT
Start: 2020-08-24 | End: 2020-08-25

## 2020-08-24 RX ORDER — SODIUM CHLORIDE 0.9 % (FLUSH) 0.9 %
10 SYRINGE (ML) INJECTION EVERY 12 HOURS SCHEDULED
Status: DISCONTINUED | OUTPATIENT
Start: 2020-08-24 | End: 2020-08-25 | Stop reason: HOSPADM

## 2020-08-24 RX ORDER — PROMETHAZINE HYDROCHLORIDE 25 MG/1
12.5 TABLET ORAL EVERY 6 HOURS PRN
Status: DISCONTINUED | OUTPATIENT
Start: 2020-08-24 | End: 2020-08-25 | Stop reason: HOSPADM

## 2020-08-24 RX ORDER — HYDRALAZINE HYDROCHLORIDE 20 MG/ML
5 INJECTION INTRAMUSCULAR; INTRAVENOUS EVERY 10 MIN PRN
Status: DISCONTINUED | OUTPATIENT
Start: 2020-08-24 | End: 2020-08-24 | Stop reason: HOSPADM

## 2020-08-24 RX ORDER — CETIRIZINE HYDROCHLORIDE 10 MG/1
10 TABLET ORAL DAILY
COMMUNITY

## 2020-08-24 RX ORDER — ROCURONIUM BROMIDE 10 MG/ML
INJECTION, SOLUTION INTRAVENOUS PRN
Status: DISCONTINUED | OUTPATIENT
Start: 2020-08-24 | End: 2020-08-24 | Stop reason: SDUPTHER

## 2020-08-24 RX ORDER — IBUPROFEN 800 MG/1
800 TABLET ORAL EVERY 8 HOURS PRN
Status: DISCONTINUED | OUTPATIENT
Start: 2020-08-24 | End: 2020-08-25 | Stop reason: HOSPADM

## 2020-08-24 RX ORDER — MIDAZOLAM HYDROCHLORIDE 1 MG/ML
INJECTION INTRAMUSCULAR; INTRAVENOUS PRN
Status: DISCONTINUED | OUTPATIENT
Start: 2020-08-24 | End: 2020-08-24 | Stop reason: SDUPTHER

## 2020-08-24 RX ORDER — METHOCARBAMOL 500 MG/1
500 TABLET, FILM COATED ORAL EVERY 6 HOURS PRN
COMMUNITY
Start: 2019-03-25

## 2020-08-24 RX ORDER — FENTANYL CITRATE 50 UG/ML
25 INJECTION, SOLUTION INTRAMUSCULAR; INTRAVENOUS EVERY 5 MIN PRN
Status: DISCONTINUED | OUTPATIENT
Start: 2020-08-24 | End: 2020-08-24 | Stop reason: HOSPADM

## 2020-08-24 RX ORDER — ACETAMINOPHEN 325 MG/1
650 TABLET ORAL EVERY 4 HOURS PRN
Status: DISCONTINUED | OUTPATIENT
Start: 2020-08-24 | End: 2020-08-25 | Stop reason: HOSPADM

## 2020-08-24 RX ORDER — SCOLOPAMINE TRANSDERMAL SYSTEM 1 MG/1
PATCH, EXTENDED RELEASE TRANSDERMAL
Status: DISCONTINUED
Start: 2020-08-24 | End: 2020-08-25 | Stop reason: HOSPADM

## 2020-08-24 RX ORDER — FENTANYL CITRATE 50 UG/ML
INJECTION, SOLUTION INTRAMUSCULAR; INTRAVENOUS PRN
Status: DISCONTINUED | OUTPATIENT
Start: 2020-08-24 | End: 2020-08-24 | Stop reason: SDUPTHER

## 2020-08-24 RX ORDER — LORATADINE 10 MG/1
10 TABLET ORAL NIGHTLY
COMMUNITY

## 2020-08-24 RX ORDER — SODIUM CHLORIDE 0.9 % (FLUSH) 0.9 %
10 SYRINGE (ML) INJECTION PRN
Status: DISCONTINUED | OUTPATIENT
Start: 2020-08-24 | End: 2020-08-25 | Stop reason: HOSPADM

## 2020-08-24 RX ORDER — SODIUM CHLORIDE, SODIUM LACTATE, POTASSIUM CHLORIDE, CALCIUM CHLORIDE 600; 310; 30; 20 MG/100ML; MG/100ML; MG/100ML; MG/100ML
INJECTION, SOLUTION INTRAVENOUS CONTINUOUS
Status: DISCONTINUED | OUTPATIENT
Start: 2020-08-24 | End: 2020-08-25

## 2020-08-24 RX ORDER — OXYCODONE HYDROCHLORIDE AND ACETAMINOPHEN 5; 325 MG/1; MG/1
1 TABLET ORAL PRN
Status: COMPLETED | OUTPATIENT
Start: 2020-08-24 | End: 2020-08-24

## 2020-08-24 RX ORDER — MANNITOL 20 G/100ML
INJECTION, SOLUTION INTRAVENOUS CONTINUOUS PRN
Status: COMPLETED | OUTPATIENT
Start: 2020-08-24 | End: 2020-08-24

## 2020-08-24 RX ORDER — SODIUM CHLORIDE 0.9 % (FLUSH) 0.9 %
10 SYRINGE (ML) INJECTION PRN
Status: DISCONTINUED | OUTPATIENT
Start: 2020-08-24 | End: 2020-08-24 | Stop reason: HOSPADM

## 2020-08-24 RX ORDER — KETOROLAC TROMETHAMINE 30 MG/ML
30 INJECTION, SOLUTION INTRAMUSCULAR; INTRAVENOUS EVERY 6 HOURS PRN
Status: DISCONTINUED | OUTPATIENT
Start: 2020-08-24 | End: 2020-08-25

## 2020-08-24 RX ORDER — VENLAFAXINE 37.5 MG/1
37.5 TABLET ORAL DAILY
Status: DISCONTINUED | OUTPATIENT
Start: 2020-08-25 | End: 2020-08-25 | Stop reason: HOSPADM

## 2020-08-24 RX ORDER — ONDANSETRON 2 MG/ML
4 INJECTION INTRAMUSCULAR; INTRAVENOUS
Status: DISCONTINUED | OUTPATIENT
Start: 2020-08-24 | End: 2020-08-24 | Stop reason: HOSPADM

## 2020-08-24 RX ORDER — PROMETHAZINE HYDROCHLORIDE 25 MG/ML
6.25 INJECTION, SOLUTION INTRAMUSCULAR; INTRAVENOUS
Status: DISCONTINUED | OUTPATIENT
Start: 2020-08-24 | End: 2020-08-24 | Stop reason: HOSPADM

## 2020-08-24 RX ORDER — HYDROMORPHONE HCL 110MG/55ML
PATIENT CONTROLLED ANALGESIA SYRINGE INTRAVENOUS PRN
Status: DISCONTINUED | OUTPATIENT
Start: 2020-08-24 | End: 2020-08-24 | Stop reason: SDUPTHER

## 2020-08-24 RX ORDER — OXYCODONE HYDROCHLORIDE AND ACETAMINOPHEN 5; 325 MG/1; MG/1
2 TABLET ORAL EVERY 4 HOURS PRN
Status: DISCONTINUED | OUTPATIENT
Start: 2020-08-24 | End: 2020-08-25 | Stop reason: HOSPADM

## 2020-08-24 RX ORDER — PROPOFOL 10 MG/ML
INJECTION, EMULSION INTRAVENOUS PRN
Status: DISCONTINUED | OUTPATIENT
Start: 2020-08-24 | End: 2020-08-24 | Stop reason: SDUPTHER

## 2020-08-24 RX ORDER — MEPERIDINE HYDROCHLORIDE 25 MG/ML
12.5 INJECTION INTRAMUSCULAR; INTRAVENOUS; SUBCUTANEOUS EVERY 5 MIN PRN
Status: DISCONTINUED | OUTPATIENT
Start: 2020-08-24 | End: 2020-08-24 | Stop reason: HOSPADM

## 2020-08-24 RX ORDER — SODIUM CHLORIDE 0.9 % (FLUSH) 0.9 %
10 SYRINGE (ML) INJECTION EVERY 12 HOURS SCHEDULED
Status: DISCONTINUED | OUTPATIENT
Start: 2020-08-24 | End: 2020-08-24 | Stop reason: HOSPADM

## 2020-08-24 RX ORDER — ONDANSETRON 2 MG/ML
4 INJECTION INTRAMUSCULAR; INTRAVENOUS EVERY 6 HOURS PRN
Status: DISCONTINUED | OUTPATIENT
Start: 2020-08-24 | End: 2020-08-25 | Stop reason: HOSPADM

## 2020-08-24 RX ORDER — KETOROLAC TROMETHAMINE 30 MG/ML
INJECTION, SOLUTION INTRAMUSCULAR; INTRAVENOUS PRN
Status: DISCONTINUED | OUTPATIENT
Start: 2020-08-24 | End: 2020-08-24 | Stop reason: SDUPTHER

## 2020-08-24 RX ORDER — OXYCODONE HYDROCHLORIDE AND ACETAMINOPHEN 5; 325 MG/1; MG/1
2 TABLET ORAL PRN
Status: COMPLETED | OUTPATIENT
Start: 2020-08-24 | End: 2020-08-24

## 2020-08-24 RX ORDER — OXYCODONE HYDROCHLORIDE AND ACETAMINOPHEN 5; 325 MG/1; MG/1
1 TABLET ORAL EVERY 4 HOURS PRN
Status: DISCONTINUED | OUTPATIENT
Start: 2020-08-24 | End: 2020-08-25 | Stop reason: HOSPADM

## 2020-08-24 RX ADMIN — KETOROLAC TROMETHAMINE 30 MG: 30 INJECTION, SOLUTION INTRAMUSCULAR at 09:24

## 2020-08-24 RX ADMIN — Medication 2 G: at 07:29

## 2020-08-24 RX ADMIN — ROCURONIUM BROMIDE 50 MG: 10 INJECTION, SOLUTION INTRAVENOUS at 07:40

## 2020-08-24 RX ADMIN — PROPOFOL 200 MG: 10 INJECTION, EMULSION INTRAVENOUS at 07:40

## 2020-08-24 RX ADMIN — FENTANYL CITRATE 50 MCG: 50 INJECTION INTRAMUSCULAR; INTRAVENOUS at 07:33

## 2020-08-24 RX ADMIN — Medication 10 ML: at 20:30

## 2020-08-24 RX ADMIN — ONDANSETRON 4 MG: 2 INJECTION, SOLUTION INTRAMUSCULAR; INTRAVENOUS at 07:33

## 2020-08-24 RX ADMIN — FENTANYL CITRATE 50 MCG: 50 INJECTION INTRAMUSCULAR; INTRAVENOUS at 07:58

## 2020-08-24 RX ADMIN — LIDOCAINE HYDROCHLORIDE 2 MG/KG/HR: 20 INJECTION, SOLUTION INFILTRATION; PERINEURAL at 07:40

## 2020-08-24 RX ADMIN — OXYCODONE HYDROCHLORIDE AND ACETAMINOPHEN 1 TABLET: 5; 325 TABLET ORAL at 11:05

## 2020-08-24 RX ADMIN — SODIUM CHLORIDE, POTASSIUM CHLORIDE, SODIUM LACTATE AND CALCIUM CHLORIDE: 600; 310; 30; 20 INJECTION, SOLUTION INTRAVENOUS at 13:59

## 2020-08-24 RX ADMIN — ONDANSETRON 4 MG: 2 INJECTION, SOLUTION INTRAMUSCULAR; INTRAVENOUS at 09:24

## 2020-08-24 RX ADMIN — METRONIDAZOLE 500 MG: 500 INJECTION, SOLUTION INTRAVENOUS at 07:01

## 2020-08-24 RX ADMIN — SODIUM CHLORIDE, POTASSIUM CHLORIDE, SODIUM LACTATE AND CALCIUM CHLORIDE: 600; 310; 30; 20 INJECTION, SOLUTION INTRAVENOUS at 07:01

## 2020-08-24 RX ADMIN — DEXAMETHASONE SODIUM PHOSPHATE 8 MG: 4 INJECTION, SOLUTION INTRAMUSCULAR; INTRAVENOUS at 07:40

## 2020-08-24 RX ADMIN — FAMOTIDINE 20 MG: 10 INJECTION, SOLUTION INTRAVENOUS at 07:01

## 2020-08-24 RX ADMIN — HYDROMORPHONE HYDROCHLORIDE 0.4 MG: 2 INJECTION, SOLUTION INTRAMUSCULAR; INTRAVENOUS; SUBCUTANEOUS at 08:30

## 2020-08-24 RX ADMIN — MIDAZOLAM 2 MG: 1 INJECTION INTRAMUSCULAR; INTRAVENOUS at 07:33

## 2020-08-24 RX ADMIN — KETOROLAC TROMETHAMINE 30 MG: 30 INJECTION, SOLUTION INTRAMUSCULAR at 17:14

## 2020-08-24 RX ADMIN — SODIUM CHLORIDE, POTASSIUM CHLORIDE, SODIUM LACTATE AND CALCIUM CHLORIDE: 600; 310; 30; 20 INJECTION, SOLUTION INTRAVENOUS at 07:33

## 2020-08-24 RX ADMIN — ACETAMINOPHEN 650 MG: 325 TABLET ORAL at 13:58

## 2020-08-24 RX ADMIN — APREPITANT 40 MG: 40 CAPSULE ORAL at 07:27

## 2020-08-24 RX ADMIN — SUGAMMADEX 222 MG: 100 INJECTION, SOLUTION INTRAVENOUS at 09:24

## 2020-08-24 RX ADMIN — LIDOCAINE HYDROCHLORIDE 100 MG: 20 INJECTION, SOLUTION INFILTRATION; PERINEURAL at 07:40

## 2020-08-24 ASSESSMENT — PULMONARY FUNCTION TESTS
PIF_VALUE: 28
PIF_VALUE: 21
PIF_VALUE: 36
PIF_VALUE: 22
PIF_VALUE: 36
PIF_VALUE: 34
PIF_VALUE: 36
PIF_VALUE: 22
PIF_VALUE: 36
PIF_VALUE: 21
PIF_VALUE: 28
PIF_VALUE: 35
PIF_VALUE: 32
PIF_VALUE: 4
PIF_VALUE: 28
PIF_VALUE: 36
PIF_VALUE: 35
PIF_VALUE: 0
PIF_VALUE: 35
PIF_VALUE: 41
PIF_VALUE: 15
PIF_VALUE: 35
PIF_VALUE: 36
PIF_VALUE: 22
PIF_VALUE: 36
PIF_VALUE: 16
PIF_VALUE: 20
PIF_VALUE: 3
PIF_VALUE: 36
PIF_VALUE: 35
PIF_VALUE: 28
PIF_VALUE: 36
PIF_VALUE: 35
PIF_VALUE: 34
PIF_VALUE: 28
PIF_VALUE: 35
PIF_VALUE: 3
PIF_VALUE: 0
PIF_VALUE: 3
PIF_VALUE: 36
PIF_VALUE: 35
PIF_VALUE: 28
PIF_VALUE: 21
PIF_VALUE: 28
PIF_VALUE: 3
PIF_VALUE: 11
PIF_VALUE: 34
PIF_VALUE: 36
PIF_VALUE: 1
PIF_VALUE: 21
PIF_VALUE: 33
PIF_VALUE: 36
PIF_VALUE: 36
PIF_VALUE: 0
PIF_VALUE: 36
PIF_VALUE: 1
PIF_VALUE: 41
PIF_VALUE: 36
PIF_VALUE: 1
PIF_VALUE: 19
PIF_VALUE: 35
PIF_VALUE: 36
PIF_VALUE: 35
PIF_VALUE: 35
PIF_VALUE: 36
PIF_VALUE: 35
PIF_VALUE: 1
PIF_VALUE: 0
PIF_VALUE: 35
PIF_VALUE: 36
PIF_VALUE: 35
PIF_VALUE: 32
PIF_VALUE: 27
PIF_VALUE: 35
PIF_VALUE: 3
PIF_VALUE: 7
PIF_VALUE: 36
PIF_VALUE: 30
PIF_VALUE: 0
PIF_VALUE: 41
PIF_VALUE: 4
PIF_VALUE: 35
PIF_VALUE: 36
PIF_VALUE: 0
PIF_VALUE: 34
PIF_VALUE: 34
PIF_VALUE: 36
PIF_VALUE: 34
PIF_VALUE: 0
PIF_VALUE: 6
PIF_VALUE: 35
PIF_VALUE: 41
PIF_VALUE: 28
PIF_VALUE: 36
PIF_VALUE: 24
PIF_VALUE: 35
PIF_VALUE: 36
PIF_VALUE: 22
PIF_VALUE: 40
PIF_VALUE: 9
PIF_VALUE: 36
PIF_VALUE: 36
PIF_VALUE: 35
PIF_VALUE: 2
PIF_VALUE: 22
PIF_VALUE: 35
PIF_VALUE: 11
PIF_VALUE: 35
PIF_VALUE: 35
PIF_VALUE: 15
PIF_VALUE: 32
PIF_VALUE: 36
PIF_VALUE: 36
PIF_VALUE: 28
PIF_VALUE: 28
PIF_VALUE: 35
PIF_VALUE: 36
PIF_VALUE: 28
PIF_VALUE: 0
PIF_VALUE: 42
PIF_VALUE: 35
PIF_VALUE: 28
PIF_VALUE: 36
PIF_VALUE: 35
PIF_VALUE: 36
PIF_VALUE: 20
PIF_VALUE: 36
PIF_VALUE: 35
PIF_VALUE: 36
PIF_VALUE: 36

## 2020-08-24 ASSESSMENT — PAIN DESCRIPTION - ORIENTATION
ORIENTATION: LEFT;UPPER
ORIENTATION: LEFT
ORIENTATION: LEFT
ORIENTATION: MID

## 2020-08-24 ASSESSMENT — LIFESTYLE VARIABLES: SMOKING_STATUS: 0

## 2020-08-24 ASSESSMENT — PAIN DESCRIPTION - LOCATION
LOCATION: ABDOMEN

## 2020-08-24 ASSESSMENT — PAIN DESCRIPTION - ONSET: ONSET: ON-GOING

## 2020-08-24 ASSESSMENT — PAIN DESCRIPTION - FREQUENCY: FREQUENCY: INTERMITTENT

## 2020-08-24 ASSESSMENT — PAIN SCALES - GENERAL
PAINLEVEL_OUTOF10: 2
PAINLEVEL_OUTOF10: 2
PAINLEVEL_OUTOF10: 0
PAINLEVEL_OUTOF10: 2
PAINLEVEL_OUTOF10: 1
PAINLEVEL_OUTOF10: 5
PAINLEVEL_OUTOF10: 4
PAINLEVEL_OUTOF10: 1

## 2020-08-24 ASSESSMENT — PAIN DESCRIPTION - PAIN TYPE
TYPE: SURGICAL PAIN
TYPE: SURGICAL PAIN
TYPE: ACUTE PAIN

## 2020-08-24 ASSESSMENT — PAIN - FUNCTIONAL ASSESSMENT
PAIN_FUNCTIONAL_ASSESSMENT: PREVENTS OR INTERFERES WITH MANY ACTIVE NOT PASSIVE ACTIVITIES
PAIN_FUNCTIONAL_ASSESSMENT: 0-10

## 2020-08-24 ASSESSMENT — PAIN DESCRIPTION - PROGRESSION: CLINICAL_PROGRESSION: NOT CHANGED

## 2020-08-24 ASSESSMENT — PAIN DESCRIPTION - DIRECTION: RADIATING_TOWARDS: DENIES

## 2020-08-24 NOTE — PROGRESS NOTES
4 Eyes Skin Assessment   Four eyes skin assessment completed at this time by Jelani Conner RN   and Diana Guerrero RN . Assessment revealed: coccyx/sacrum is c, d, I, and blanchable, edema +2 pitting lle, pt is negative for any skin integrity issues,   Pt is alert and oriented. Verbalized understanding of procedure, consent form signed, iv started x2 , pt tolerated well. The patient is being assessed for  Admission    I agree that 2 RN's have performed a thorough Head to Toe Skin Assessment on the patient. ALL assessment sites listed below have been assessed.        Areas assessed by both nurses:  [x]   Head, Face, and Ears   [x]   Shoulders, Back, and Chest  [x]   Arms, Elbows, and Hands   [x]   Coccyx, Sacrum, and Ischum  [x]   Legs, Feet, and Heels              Jelani Conner RN

## 2020-08-24 NOTE — FLOWSHEET NOTE
08/24/20 1249   Vital Signs   Temp 97.2 °F (36.2 °C)   Temp Source Oral   Pulse 73   Heart Rate Source Monitor   Resp 18   /70   BP Location Right upper arm   BP Upper/Lower Upper   Level of Consciousness 0   MEWS Score 1   Patient Currently in Pain Yes   Pain Assessment   Pain Assessment 0-10   Pain Level 2   POSS Score (Patient Ctrl Analgesia) Awake and Alert   Pain Location Abdomen   Pain Orientation Left   Non-Pharmaceutical Pain Intervention(s) Repositioned; Rest   Oxygen Therapy   SpO2 95 %   O2 Device Nasal cannula   O2 Flow Rate (L/min) 2 L/min     Pt arrived to room 223-2 in stable condition. Pt alert and oriented, reports abd pain 2/10, cool pack applied. VSS. Abd inclisions x4 clean dry and intact, edges well approximated with surgiglue. Pt's  Jhony Forrester at bedside. SCD's placed and turned on. No other needs or wants at this time.

## 2020-08-24 NOTE — PROGRESS NOTES
Patients abdomen soft and abdominal wounds times four dry and intact. States pain is 2/10. Taking sips of water. Waiting for transport.

## 2020-08-24 NOTE — ANESTHESIA POSTPROCEDURE EVALUATION
Department of Anesthesiology  Postprocedure Note    Patient: Agustina Benoit  MRN: [id number]  YOB: 1970  Date of evaluation: 8/24/2020    Procedure Summary     Date:  08/24/20 Room / Location:  [address] [place] [address]    Anesthesia Start:  7246 Anesthesia Stop:  9887    Procedure:  ROBOTIC ASSISTED LAPAROSCOPIC HYSTERECTOMY, BILATERAL SALPINGECTOMY, CYSTOSCOPY (N/A Uterus) Diagnosis:  (ABNORMAL UTERINE BLEEDING POST ABLATION)    Surgeon:  Elio Wilburn MD Responsible Provider:  Kamran Monahan MD    Anesthesia Type:  general ASA Status:  3        Anesthesia Type: general    Vane Phase I: Vane Score: 9    Vane Phase II:      Last vitals: Reviewed and per EMR flowsheets.      Anesthesia Post Evaluation   Anesthetic Problems: no   Cardiovascular System Stable: yes  Respiratory Function: Airway Patent yes  ETT no  Ventilator no  Level of consciousness: awake, alert and oriented  Post-op pain: adequate analgesia  Hydration Adequate: yes  Nausea/Vomiting:no  Other Issues:     Clarence Minaya MD

## 2020-08-24 NOTE — OP NOTE
[address]                 [address] [address]                                OPERATIVE REPORT    PATIENT NAME: Michael Wayne                     :        1970  MED REC NO:   [id number]                          ROOM:  ACCOUNT NO:   [de-identified]                           ADMIT DATE: 2020  PROVIDER:     Kaya Marti MD    DATE OF PROCEDURE:  2020    PREOPERATIVE DIAGNOSIS:  Endometrial hyperplasia without atypia. POSTOPERATIVE DIAGNOSIS:  Endometrial hyperplasia without atypia. OPERATION PERFORMED:  Robotic-assisted total laparoscopic hysterectomy,  bilateral salpingectomy, cystoscopy. SURGEON:  Kaya Marti MD    ANESTHESIA:  General.    COMPLICATIONS:  None. ESTIMATED BLOOD LOSS:  50 mL. SPECIMENS:  Uterus, cervix, fallopian tubes. FINDINGS:  Globally enlarged uterus, normal fallopian tubes, small  simple cyst bilaterally; otherwise, normal ovaries, normal pelvic and  abdominal survey. INDICATIONS:  The patient is a 55-year-old with prior endometrial  ablation in . This year, she began to have more persistent unusual  and heavy bleeding. The patient had an ultrasound on 2020, which  showed endometrial stripe of 60 mm and multiple small fibroids, one  likely submucosal.  She underwent hysteroscopy, hysteroscopic myomectomy  on  with hyperplasia without atypia on final pathology. Options of  hormonal treatment versus surgical reviewed, and the patient desired  hysterectomy. Risks, benefits, and alteratives of procedures were  discussed. Questions were answered. Consent was signed. OPERATIVE PROCEDURE:  The patient was taken to the operating room where  she was placed under general anesthesia with SCDs on and working. When  this was adequate, she was prepped and draped in normal sterile fashion  and placed in a dorsal lithotomy position. A Barnes catheter was  inserted inside the bladder. Fowler retractors were placed inside the  vagina. A single-tooth tenaculum was placed on the anterior cervical  lip. The cervix was dilated and the uterus sounded to 10 cm. A large  VCare was then inserted up to the fundus and inflated. All other  vaginal instruments were removed. Attention was turned to the abdomen  where a Veress needle was inserted inside the umbilicus and into the  peritoneum. This was confirmed with water drop test as well as low  opening abdominal pressure. The abdomen was insufflated and the Veress  needle was removed. An 8 mm supraumbilical incision was made to place  the camera trocar. The camera was inserted with the above findings. No  injury to bowel, omentum, or vasculature was noted. Two 8 mm trocars  were then placed, one each on the right and left lateral quadrants under  direct visualization and an 11 mm trocar was then placed in the left  upper quadrant with an assistant port. The patient was placed in the  steep Trendelenburg and the robot was docked. The course of the ureters  was noted bilaterally and the round ligaments bilaterally were  transected and ligated using the vessel sealing device. The fallopian  tubes were then amputated from the mesosalpinx and the tubo-ovarian  ligament was then transected and ligated using the vessel-sealing  device. The broad ligament was further skeletonized to show  visualization of the uterine arteries, which were bilaterally transected  and ligated using the vessel sealing device. The cardinal ligaments  were then opened and the colpotomy was created using monopolar cautery  over the VCare cup. The specimen was removed from the vagina. Good  hemostasis was noted. The cuff was closed with 0 Stratafix in a running  fashion. Pelvis was irrigated and excellent hemostasis was noted.    Attention was turned to the cystoscopy where 20% Mannitol was used as a  distention media and strong bilateral ureteral jets as well as normal  bladder mucosa was noted. Attention was turned to the abdomen again and  the fascia of the 11 mm trocar was closed with 0 Vicryl using the InferX  fascial closure device. All instruments were then removed and the skin  was closed with 4-0 Monocryl and Dermabond. The patient tolerated the  procedure well. Sponge, lap, and needle counts were correct x2. I  performed the procedure.         Edwin Appiah MD    D: 08/24/2020 9:41:52       T: 08/24/2020 12:21:10     [email or web address]  Job#: [id number]     Doc#: [id number]    CC:

## 2020-08-24 NOTE — BRIEF OP NOTE
Department of Gynecology  Brief Operative Report           Pre-operative Diagnosis:  Endometrial hyperplasia without atypia     Post-operative Diagnosis:  Same     Procedure:  Robotic assisted total laparoscopic hysterectomy, bilateral salpingectomy, cystoscopy      Surgeon:  Shameka Guardado     Anesthesia:  General Endotracheal Anesthesia     Findings:  Globally enlarged uterus, normal fallopian tubes, small simple cysts bilaterally otherwise normal ovaries. Normal pelvic and abdominal survey     Estimated blood loss:  50 ml     Specimens:  Uterus, cervix, fallopian tubes      Complications:  none     Dictation Number:  [id number]     See dictated operative report for full details.

## 2020-08-24 NOTE — FLOWSHEET NOTE
08/24/20 1603   Vital Signs   Temp 97.2 °F (36.2 °C)   Temp Source Oral   Pulse 74   Heart Rate Source Monitor   Resp 16   /60   BP Location Left upper arm   Patient Position Semi fowlers   Level of Consciousness 0   MEWS Score 1   Oxygen Therapy   SpO2 96 %   O2 Device Nasal cannula   O2 Flow Rate (L/min) 2 L/min   Pt in bed resting at this time,  at bedside. VSS. Pt 96% on 2liters of oxygen. Nasal canula removed at this time per order. Pt on room air, will monitor. No other needs or wants voiced at this time.

## 2020-08-24 NOTE — INTERVAL H&P NOTE
Update History & Physical    The patient's History and Physical of August 17, 2020 was reviewed with the patient and I examined the patient. There was no change. The surgical site was confirmed by the patient and me. Plan: The risks, benefits, expected outcome, and alternative to the recommended procedure have been discussed with the patient. Patient understands and wants to proceed with the procedure.      Electronically signed by Jay Galindo MD on 8/24/2020 at 7:03 AM

## 2020-08-24 NOTE — PROGRESS NOTES
Pt reports pain 2/10 in LL Abdomen. Pt request to be medicated with only tylenol at this time. No other needs or wants voiced at this time. Call light in reach,  at bedside.

## 2020-08-25 VITALS
HEART RATE: 76 BPM | BODY MASS INDEX: 43.41 KG/M2 | OXYGEN SATURATION: 98 % | SYSTOLIC BLOOD PRESSURE: 129 MMHG | TEMPERATURE: 98.4 F | WEIGHT: 245 LBS | DIASTOLIC BLOOD PRESSURE: 72 MMHG | HEIGHT: 63 IN | RESPIRATION RATE: 16 BRPM

## 2020-08-25 LAB
ANION GAP SERPL CALCULATED.3IONS-SCNC: 8 MMOL/L (ref 3–16)
BUN BLDV-MCNC: 14 MG/DL (ref 7–20)
CALCIUM SERPL-MCNC: 8.5 MG/DL (ref 8.3–10.6)
CHLORIDE BLD-SCNC: 100 MMOL/L (ref 99–110)
CO2: 27 MMOL/L (ref 21–32)
CREAT SERPL-MCNC: 0.6 MG/DL (ref 0.6–1.1)
GFR AFRICAN AMERICAN: >60
GFR NON-AFRICAN AMERICAN: >60
GLUCOSE BLD-MCNC: 108 MG/DL (ref 70–99)
HCT VFR BLD CALC: 34.8 % (ref 36–48)
HEMOGLOBIN: 11.5 G/DL (ref 12–16)
MCH RBC QN AUTO: 27.1 PG (ref 26–34)
MCHC RBC AUTO-ENTMCNC: 33.2 G/DL (ref 31–36)
MCV RBC AUTO: 81.6 FL (ref 80–100)
PDW BLD-RTO: 14.8 % (ref 12.4–15.4)
PLATELET # BLD: 182 K/UL (ref 135–450)
PMV BLD AUTO: 8.9 FL (ref 5–10.5)
POTASSIUM SERPL-SCNC: 3.4 MMOL/L (ref 3.5–5.1)
RBC # BLD: 4.26 M/UL (ref 4–5.2)
SODIUM BLD-SCNC: 135 MMOL/L (ref 136–145)
WBC # BLD: 13.9 K/UL (ref 4–11)

## 2020-08-25 PROCEDURE — 2580000003 HC RX 258: Performed by: OBSTETRICS & GYNECOLOGY

## 2020-08-25 PROCEDURE — 6360000002 HC RX W HCPCS: Performed by: OBSTETRICS & GYNECOLOGY

## 2020-08-25 PROCEDURE — 36415 COLL VENOUS BLD VENIPUNCTURE: CPT

## 2020-08-25 PROCEDURE — 6370000000 HC RX 637 (ALT 250 FOR IP): Performed by: OBSTETRICS & GYNECOLOGY

## 2020-08-25 PROCEDURE — G0378 HOSPITAL OBSERVATION PER HR: HCPCS

## 2020-08-25 PROCEDURE — 80048 BASIC METABOLIC PNL TOTAL CA: CPT

## 2020-08-25 PROCEDURE — 96374 THER/PROPH/DIAG INJ IV PUSH: CPT

## 2020-08-25 PROCEDURE — 85027 COMPLETE CBC AUTOMATED: CPT

## 2020-08-25 RX ORDER — OXYCODONE HYDROCHLORIDE AND ACETAMINOPHEN 5; 325 MG/1; MG/1
1 TABLET ORAL EVERY 6 HOURS PRN
Qty: 28 TABLET | Refills: 0 | Status: SHIPPED | OUTPATIENT
Start: 2020-08-25 | End: 2020-09-01

## 2020-08-25 RX ADMIN — SODIUM CHLORIDE, POTASSIUM CHLORIDE, SODIUM LACTATE AND CALCIUM CHLORIDE: 600; 310; 30; 20 INJECTION, SOLUTION INTRAVENOUS at 08:46

## 2020-08-25 RX ADMIN — SODIUM CHLORIDE, POTASSIUM CHLORIDE, SODIUM LACTATE AND CALCIUM CHLORIDE: 600; 310; 30; 20 INJECTION, SOLUTION INTRAVENOUS at 00:52

## 2020-08-25 RX ADMIN — VENLAFAXINE 37.5 MG: 37.5 TABLET ORAL at 08:46

## 2020-08-25 RX ADMIN — ACETAMINOPHEN 650 MG: 325 TABLET ORAL at 08:46

## 2020-08-25 RX ADMIN — Medication 10 ML: at 08:48

## 2020-08-25 RX ADMIN — KETOROLAC TROMETHAMINE 30 MG: 30 INJECTION, SOLUTION INTRAMUSCULAR at 00:52

## 2020-08-25 ASSESSMENT — PAIN SCALES - GENERAL
PAINLEVEL_OUTOF10: 4
PAINLEVEL_OUTOF10: 5

## 2020-08-25 NOTE — PROGRESS NOTES
Report and transfer of care given to [address]. Pt in bed awake, respirations easy and unlabored. Pt denies any wants or needs at this time. Call light in reach.

## 2020-08-25 NOTE — PROGRESS NOTES
Postop     Pt is a 48 y.o. POD1 s/p RATH , bilateral salpingectomy, cysto recovering well. Pain well controlled. Minimal bleeding. Tolerating diet. Ambulating.  Voiding    Vitals:    08/24/20 2018 08/25/20 0027 08/25/20 0401 08/25/20 0844   BP: 126/66 (!) 97/57 108/66 129/72   Pulse: 68 66 53 76   Resp: 16 16 16 16   Temp: 98.6 °F (37 °C) 97.3 °F (36.3 °C) 97.9 °F (36.6 °C) 98.4 °F (36.9 °C)   TempSrc: Oral Oral Oral Oral   SpO2: 97% 96% 94% 98%   Weight:       Height:         Abdomen soft, incisions well healing  Ext: non tender    Hgb 11.5     A/P: POD1  Vitals wnl  Continue routine postop care  Pelvic and lifting restrictions reviewed    Chester Escalera MD

## 2020-08-25 NOTE — DISCHARGE SUMMARY
Physician Discharge Summary     Patient ID:  Ethel Velazquez  [id number]  48 y.o.  1970    Admit date: 8/24/2020    Discharge date and time: 8/25/20    Admitting Physician: Katie Adan MD     Discharge Physician: Britany Messina MD    Admission Diagnoses: S/P hysterectomy [Z90.710]    Discharge Diagnoses: S/P Hysterectomy    Admission Condition: good    Discharged Condition: good    Indication for Admission: Scheduled hysterectomy    Hospital Course: Pt underwent uncomplicated robotic assisted total laparoscopic hysterectomy, bilateral salpingectomy cystoscopy. Pt was meeting all discharge criteria by POD1. Postop hgb 11.5. Outstanding Order Results     Date and Time Order Name Status Description    8/24/2020 0900 Surgical Pathology In process           Discharge Exam:  Abdomen soft appropriately tender, incisions well healing  Extremities non tender    Disposition: home    Patient Instructions:   [unfilled]  Activity: Nothing in the vagina and no heavy lifting  Diet: regular diet  Wound Care: keep wound clean and dry    Follow-up with Dr. Ganesh Cyr in 2 weeks.     Signed:  Katie Adan  8/25/2020  9:10 AM

## 2020-08-25 NOTE — PROGRESS NOTES
AM assessment completed, see flow sheet. Pt is alert and oriented. Vital signs are WNL. Respirations are even & easy. No complaints voiced denies pain stated tolerable. Pt denies needs at this time. SR up x 2, and bed in low position. Call light is within reach.

## 2020-12-11 ENCOUNTER — OFFICE VISIT (OUTPATIENT)
Dept: PRIMARY CARE CLINIC | Age: 50
End: 2020-12-11
Payer: COMMERCIAL

## 2020-12-11 PROCEDURE — 99211 OFF/OP EST MAY X REQ PHY/QHP: CPT | Performed by: NURSE PRACTITIONER

## 2020-12-11 NOTE — PROGRESS NOTES
Michell RICHMOND Alexsandra received a viral test for COVID-19. They were educated on isolation and quarantine as appropriate. For any symptoms, they were directed to seek care from their PCP, given contact information to establish with a doctor, directed to an urgent care or the emergency room.

## 2020-12-12 LAB — SARS-COV-2, NAA: NOT DETECTED

## 2023-12-07 ENCOUNTER — OFFICE VISIT (OUTPATIENT)
Dept: UROGYNECOLOGY | Age: 53
End: 2023-12-07
Payer: COMMERCIAL

## 2023-12-07 VITALS
SYSTOLIC BLOOD PRESSURE: 110 MMHG | TEMPERATURE: 98.1 F | OXYGEN SATURATION: 99 % | RESPIRATION RATE: 18 BRPM | HEART RATE: 68 BPM | DIASTOLIC BLOOD PRESSURE: 70 MMHG | BODY MASS INDEX: 43.2 KG/M2 | WEIGHT: 240 LBS

## 2023-12-07 DIAGNOSIS — R35.0 URINARY FREQUENCY: Primary | ICD-10-CM

## 2023-12-07 DIAGNOSIS — N89.8 VAGINAL DISCHARGE: ICD-10-CM

## 2023-12-07 DIAGNOSIS — R39.15 URINARY URGENCY: ICD-10-CM

## 2023-12-07 DIAGNOSIS — N39.41 URGE INCONTINENCE: ICD-10-CM

## 2023-12-07 LAB
BILIRUBIN, POC: NORMAL
BLOOD URINE, POC: NORMAL
CLARITY, POC: CLEAR
COLOR, POC: YELLOW
EMPTY COUGH STRESS TEST: NORMAL
FIRST SENSATION: 60 CC
FULL COUGH STRESS TEST: NORMAL
GLUCOSE URINE, POC: NORMAL
KETONES, POC: NORMAL
LEUKOCYTE EST, POC: NORMAL
MAX SENSATION: 225 CC
NITRATE, URINE POC: NORMAL
NITRITE, POC: NORMAL
PH, POC: 6
POST VOID RESIDUAL (PVR): 10 ML
PROTEIN, POC: NORMAL
RBC URINE, POC: NORMAL
SECOND SENSATION: 150 CC
SPASM: NORMAL
SPECIFIC GRAVITY, POC: 1.02
UROBILINOGEN, POC: NORMAL
WBC URINE, POC: NORMAL

## 2023-12-07 PROCEDURE — 99204 OFFICE O/P NEW MOD 45 MIN: CPT | Performed by: NURSE PRACTITIONER

## 2023-12-07 PROCEDURE — 51725 SIMPLE CYSTOMETROGRAM: CPT | Performed by: NURSE PRACTITIONER

## 2023-12-07 PROCEDURE — 81002 URINALYSIS NONAUTO W/O SCOPE: CPT | Performed by: NURSE PRACTITIONER

## 2023-12-07 RX ORDER — SACUBITRIL AND VALSARTAN 49; 51 MG/1; MG/1
1 TABLET, FILM COATED ORAL 2 TIMES DAILY
COMMUNITY
Start: 2023-10-10

## 2023-12-07 RX ORDER — TORSEMIDE 20 MG/1
60 TABLET ORAL DAILY
COMMUNITY
Start: 2023-09-05

## 2023-12-07 RX ORDER — CYCLOBENZAPRINE HCL 10 MG
10 TABLET ORAL 3 TIMES DAILY PRN
COMMUNITY

## 2023-12-07 RX ORDER — ASCORBIC ACID 250 MG
500 TABLET ORAL PRN
COMMUNITY

## 2023-12-07 RX ORDER — SOLRIAMFETOL 75 MG/1
TABLET, FILM COATED ORAL
COMMUNITY
Start: 2023-12-07

## 2023-12-07 RX ORDER — ATORVASTATIN CALCIUM 10 MG/1
10 TABLET, FILM COATED ORAL DAILY
COMMUNITY
Start: 2023-07-18

## 2023-12-07 RX ORDER — TOLTERODINE 4 MG/1
4 CAPSULE, EXTENDED RELEASE ORAL DAILY
Qty: 30 CAPSULE | Refills: 1 | Status: SHIPPED | OUTPATIENT
Start: 2023-12-07 | End: 2023-12-07 | Stop reason: CLARIF

## 2023-12-07 RX ORDER — SPIRONOLACTONE 25 MG/1
25 TABLET ORAL DAILY
COMMUNITY
Start: 2023-09-05

## 2023-12-07 RX ORDER — NITROGLYCERIN 0.4 MG/1
0.4 TABLET SUBLINGUAL EVERY 5 MIN PRN
COMMUNITY
Start: 2022-06-03

## 2023-12-07 RX ORDER — ASPIRIN 81 MG/1
81 TABLET ORAL DAILY
COMMUNITY
Start: 2022-11-01

## 2023-12-07 RX ORDER — AMLODIPINE BESYLATE 5 MG/1
5 TABLET ORAL DAILY
COMMUNITY
Start: 2022-08-18

## 2023-12-07 ASSESSMENT — ENCOUNTER SYMPTOMS
BACK PAIN: 1
APNEA: 1
DIARRHEA: 1
COUGH: 1
SINUS PRESSURE: 1
SHORTNESS OF BREATH: 1
NAUSEA: 1
CONSTIPATION: 1

## 2023-12-07 NOTE — PROGRESS NOTES
12/7/2023      HPI:     Name: Maxine Bennett  YOB: 1970    CC: Patient is a 48 y.o. female who is seen in consultation from Anali Guerra [address] [place] [address] - KENDRA   for evaluation of  incontinence and urinary urgency/frequency. HPI:  patient presents for evaluation of urinary incontinence present for a long time, more problematic in the past 3 years. She has previously seen Dr. Lamar Leach and had a sling procedure for FAUZIA and also recalls being put on oxybutynin. She is uncertain why she stopped this and does recall that it was helpful for her urgency. She is currently on some new medications including Jardiance and HCTZ, does have frequency. Most bothered by inability to make it to restroom. Patient does have hx of Robotic Assisted Laparoscopic Hysterectomy, Bilateral Salpingectomy, and Cystoscopy in 2020 by Dr. Royce Dennison. Bladder control problem: Yes   How many months have you had a bladder problem? 2-3 years  Do you use pads to absorb lost urine? Yes. If yes how many pads do you wear a day? 2   How many trips do you make to the bathroom during the day? 10+  How many times do you wake at night to go to the bathroom? 1  Do you ever wet the bed while asleep? Yes- \"not bed, but myself\"  Are there times when you cannot make it to the bathroom on time? Yes  Does sound, sight, or feel of running water cause you to lose urine? No  How many ounces of liquid do you consume daily? 68+  How many drinks containing caffeine do you consume daily? 1-2  Which best describes urine loss: (Check all that apply)  [x] I lose urine during coughing, sneezing, running, lifting  [x] I lose urine with changes in posture, standing, walking  [] I lose urine continuously such that I am constantly wet  [x] I have sudden, urgent needs without the ability to make it to the bathroom  Have you seen a physician for complaints of urine loss? Yes If yes, who? Dr. Lamar Leach  Have you taken medication to prevent urine loss?  Yes If

## 2023-12-08 LAB
CANDIDA DNA VAG QL NAA+PROBE: NORMAL
G VAGINALIS DNA SPEC QL NAA+PROBE: NORMAL
T VAGINALIS DNA VAG QL NAA+PROBE: NORMAL

## 2023-12-08 NOTE — RESULT ENCOUNTER NOTE
Patient advised of normal test result- verbalized understanding. Denies any questions or concerns at this time.

## 2024-01-12 ENCOUNTER — OFFICE VISIT (OUTPATIENT)
Dept: UROGYNECOLOGY | Age: 54
End: 2024-01-12

## 2024-01-12 VITALS
RESPIRATION RATE: 16 BRPM | DIASTOLIC BLOOD PRESSURE: 77 MMHG | SYSTOLIC BLOOD PRESSURE: 132 MMHG | OXYGEN SATURATION: 98 % | TEMPERATURE: 97.5 F | HEART RATE: 96 BPM

## 2024-01-12 DIAGNOSIS — N39.41 URGE INCONTINENCE: ICD-10-CM

## 2024-01-12 DIAGNOSIS — R39.15 URINARY URGENCY: ICD-10-CM

## 2024-01-12 DIAGNOSIS — N95.2 VAGINAL ATROPHY: ICD-10-CM

## 2024-01-12 DIAGNOSIS — R35.0 URINARY FREQUENCY: Primary | ICD-10-CM

## 2024-01-12 RX ORDER — ESTRADIOL 0.1 MG/G
0.25 CREAM VAGINAL DAILY
Qty: 30 G | Refills: 1 | Status: SHIPPED | OUTPATIENT
Start: 2024-01-12

## 2024-01-12 RX ORDER — OXYCODONE HYDROCHLORIDE AND ACETAMINOPHEN 5; 325 MG/1; MG/1
TABLET ORAL
COMMUNITY

## 2024-01-12 NOTE — PROGRESS NOTES
Constitutional:       Appearance: Normal appearance.   HENT:      Head: Normocephalic.   Eyes:      Conjunctiva/sclera: Conjunctivae normal.   Cardiovascular:      Rate and Rhythm: Normal rate.   Pulmonary:      Effort: Pulmonary effort is normal.   Musculoskeletal:         General: Normal range of motion.      Cervical back: Normal range of motion.   Skin:     General: Skin is warm and dry.   Neurological:      General: No focal deficit present.      Mental Status: She is alert.   Psychiatric:         Mood and Affect: Mood normal.         Behavior: Behavior normal.         No results found for this visit on 01/12/24.    Assessment/Plan:     Michell Upton is a 53 y.o. female with   1. Urinary frequency    2. Urinary urgency    3. Urge incontinence    4. Vaginal atrophy      -UUI:  Is having improvement on Myrbetriq noting more time to get to restroom.  Will have some small leaks on occasion  Prior to increasing dose we elected to wait for evaluation and treatment with PFPT which she begins next week  Her BP is stable  -Vaginal atrohy:  begin topical vaginal estrogen cream  R/B, SE and instruction for topical use reviewed.  F/U with me in 4-5 months  DAISY Mccullough - CNP    No orders of the defined types were placed in this encounter.    Orders Placed This Encounter   Medications    estradiol (ESTRACE VAGINAL) 0.1 MG/GM vaginal cream     Sig: Place 0.25 g vaginally daily Apply 0.25g with fingertip to the vaginal opening every night at bedtime for 2 weeks, then decrease to twice weekly.     Dispense:  30 g     Refill:  1    mirabegron (MYRBETRIQ) 25 MG TB24     Sig: Take 1 tablet by mouth daily     Dispense:  30 tablet     Refill:  5       DAISY Mccullough - TAURUS

## 2024-03-12 ENCOUNTER — HOSPITAL ENCOUNTER (OUTPATIENT)
Dept: PHYSICAL THERAPY | Age: 54
Setting detail: THERAPIES SERIES
Discharge: HOME | End: 2024-03-12
Payer: COMMERCIAL

## 2024-03-12 PROCEDURE — 97161 PT EVAL LOW COMPLEX 20 MIN: CPT

## 2024-03-12 PROCEDURE — 97530 THERAPEUTIC ACTIVITIES: CPT

## 2024-03-12 PROCEDURE — 97140 MANUAL THERAPY 1/> REGIONS: CPT

## 2024-03-12 NOTE — PROGRESS NOTES
[facility] - Outpatient Rehabilitation and Therapy, [address], [address]  [place]  [address]  Phone: [phone]  Fax [phone]      Physical Therapy Daily Treatment Note    Date:  3/12/2024    Patient Name:  Michell Upton    :  1970  MRN: [id number]    Restrictions/Precautions:  universal   Allergies: Amoxicillin, Amoxicillin-pot clavulanate, and Clavulanic acid   Preferred Language for Healthcare:   [x] English       [] other:    Medical Diagnosis Information:  Diagnosis: R35.0 (ICD-10-CM) - Urinary frequency  R39.15 (ICD-10-CM) - Urinary urgency  N39.41 (ICD-10-CM) - Urge incontinence  Treatment Diagnosis:pelvic floor and core weakness       Insurance/Certification information:  PT Insurance Information: BC  Physician Information:  Celia Montalvo APRN - CNP  Plan of care signed (Y/N):  N    Visit# / total visits:         Functional Outcome:          PFDI-20:133/300 (Lower score = Better function)         Time in:   7:30am      Timed Treatment: 45min Total Treatment Time:  60min  ________________________________________________________________________________________    Pain Level:    /10  SUBJECTIVE:  see eval    OBJECTIVE:     Exercise (TE) Resistance/Repetitions Other comments            PF strengthening        Short hold: (1sec) 10 times       Long hold: (3-5sec) 10 times                     PF relaxation                                 HEP instruction: Written HEP instructions provided and reviewed  Access Code: [email or web address]  URL: [email or web address]  Date: 2024  Prepared by: Saige Lyon     Exercises  - Seated Pelvic Floor Contraction  - 3 x daily - 7 x weekly - 1 sets - 10 reps - 1sec. hold  - Seated Pelvic Floor Contraction  - 3 x daily - 7 x weekly - 1 sets - 10 reps - 3-5sec. Hold    Other Treatment:   TA:  Bladder re-training/education:     Bladder Diary: patient educated on importance of filling out bladder diary at home,

## 2024-03-12 NOTE — THERAPY EVALUATION
[facility] - Outpatient Rehabilitation and Therapy, [facility]  [address] [place] [address], [address]  [place]  [address]  Phone: [phone]  Fax [phone]                                                        Physical Therapy Certification    Dear Celia Montalvo APRN - CNP,    We had the pleasure of evaluating the following patient for physical therapy services at [facility].  A summary of our findings can be found in the initial assessment below.  This includes our plan of care.  If you have any questions or concerns regarding these findings, please do not hesitate to contact me at the office phone number checked above.  Thank you for the referral.       Physician Signature:_______________________________Date:__________________  By signing above (or electronic signature), therapist's plan is approved by physician      Patient: Michell Upton   : 1970   MRN: [id number]  Referring Physician: Celia Montalvo APRN - CNP      Evaluation Date: 3/12/2024      Medical Diagnosis Information:  Diagnosis: R35.0 (ICD-10-CM) - Urinary frequency  R39.15 (ICD-10-CM) - Urinary urgency  N39.41 (ICD-10-CM) - Urge incontinence      Treatment Diagnosis:   pelvic floor and core weakness                                       Insurance information: PT Insurance Information: BCBS    Chaperone offered for pelvic examination?  [] No    [x] Yes  Chaperone requested for examination:  [x] No    [] Yes   If yes, who was present:    Precautions/ Contra-indications: universal     Latex Allergy:  [x]NO      []YES  Allergies: Amoxicillin, Amoxicillin-pot clavulanate, and Clavulanic acid     Preferred Language for Healthcare:   [x]English       []Other:    C-SSRS Triggered by Intake questionnaire (Past 2 wk assessment ):   [x] No, Questionnaire did not trigger screening.   [] Yes, Patient intake triggered C-SSRS Screening     [] Completed, no further action required.   [] Completed, PCP

## 2024-03-25 ENCOUNTER — HOSPITAL ENCOUNTER (OUTPATIENT)
Dept: PHYSICAL THERAPY | Age: 54
Setting detail: THERAPIES SERIES
Discharge: HOME | End: 2024-03-25
Payer: COMMERCIAL

## 2024-03-25 PROCEDURE — 97110 THERAPEUTIC EXERCISES: CPT

## 2024-03-25 PROCEDURE — 97530 THERAPEUTIC ACTIVITIES: CPT

## 2024-03-25 NOTE — PROGRESS NOTES
hold  - Seated Pelvic Floor Contraction with Isometric Hip Adduction  - 1 x daily - 7 x weekly - 1 sets - 10 reps - 2-3sec. hold  - Seated Pelvic Floor Contraction with Hip Abduction and Resistance Loop  - 1 x daily - 7 x weekly - 1 sets - 10 reps - 2-3sec. hold    Other Treatment:   TA:  Bladder re-training/education:     Bladder Diary: 7-14 day, 0 leaks, 1 hard to loose stool/day    Voiding: patient educated on normal voiding of every 2-3 hours; though is on a water pill.    Dietary: patient educated on the \"4Cs\" to reduce bladder irritation and leakage, educated on importance of water intake (patient getting in about 40-60oz/day) and importance of fiber intake (very minimal intake currently)    Other: Vaginal moisturizer, gel water-based lubricant.    Manual Treatments:      Modalities:  --    Test/Measurements:  see eval             ASSESSMENT:  Patient responded well to today's session.  Patient adapted nicely to added Te this date.  Updated HEP issued to patient.  Patient to work on above dietary recommendations and monitor for changes as well.  Patient to follow up in 1 week for advancement.     Treatment/Activity Tolerance:   [x] Patient tolerated treatment well [] Patient limited by fatique  [] Patient limited by pain [] Patient limited by other medical complications  [] Other:     Goals:      Patient stated goal: \"stop leaking\"  [] Progressing: [] Met: [] Not Met: [] Adjusted        Therapist goals for Patient:   Short Term Goals: To be achieved in: 2 weeks  1. Independent in HEP and progression per patient tolerance, in order to prevent future occurrence of presenting issue.  [] Progressing: [] Met: [] Not Met: [] Adjusted  2. Patient will have a 25% decrease in urinary incontinence frequency to indicate improvement in pelvic floor strength and relaxation, muscle coordination, and/or bladder retraining.  [] Progressing: [] Met: [] Not Met: [] Adjusted     Long Term Goals: To be achieved in: 12 weeks  1.

## 2024-04-01 ENCOUNTER — HOSPITAL ENCOUNTER (OUTPATIENT)
Dept: PHYSICAL THERAPY | Age: 54
Setting detail: THERAPIES SERIES
Discharge: HOME | End: 2024-04-01
Payer: COMMERCIAL

## 2024-04-01 PROCEDURE — 97110 THERAPEUTIC EXERCISES: CPT

## 2024-04-01 NOTE — PROGRESS NOTES
[facility] - Outpatient Rehabilitation and Therapy, [address], [address]  [place]  [address]  Phone: [phone]  Fax [phone]      Physical Therapy Daily Treatment Note    Date:  2024    Patient Name:  Michell Upton    :  1970  MRN: [id number]    Restrictions/Precautions:  universal   Allergies: Amoxicillin, Amoxicillin-pot clavulanate, and Clavulanic acid   Preferred Language for Healthcare:   [x] English       [] other:    Medical Diagnosis Information:  Diagnosis: R35.0 (ICD-10-CM) - Urinary frequency  R39.15 (ICD-10-CM) - Urinary urgency  N39.41 (ICD-10-CM) - Urge incontinence  Treatment Diagnosis:pelvic floor and core weakness       Insurance/Certification information:  PT Insurance Information: BC  Physician Information:  Celia Montalvo APRN - CNP  Plan of care signed (Y/N):  Y    Visit# / total visits:         Functional Outcome:          PFDI-20:133/300 (Lower score = Better function)         Time in:   7:25am      Timed Treatment: 35min Total Treatment Time:  35min  ________________________________________________________________________________________    Pain Level:    0/10  SUBJECTIVE:  Patient reports \"this week went OK, I am trying to figure out the fiber\".  \"I had a huge leak this morning\".  Reports compliance with HEP.     OBJECTIVE:     Exercise (TE) Resistance/Repetitions Other comments            PF strengthening        Short hold: (1sec) 10 times       Long hold: (3-5sec) 10 times     PF + hip ABD x10 W/ orange tband   PF + hip ADD x10        PF squat x10 *added today   Hook-lying TA Until achieved  TA only  TA + PF  March: x10 *added today                    HEP instruction: Written HEP instructions provided and reviewed  Access Code: [email or web address]  URL: [email or web address]  Date: 2024  Prepared by: Saige Lyon    Exercises  - Seated Pelvic Floor Contraction  - 3 x daily - 7 x weekly - 1 sets - 10 reps - 1sec.

## 2024-04-11 ENCOUNTER — HOSPITAL ENCOUNTER (OUTPATIENT)
Dept: PHYSICAL THERAPY | Age: 54
Setting detail: THERAPIES SERIES
End: 2024-04-11
Payer: COMMERCIAL

## 2024-04-22 ENCOUNTER — APPOINTMENT (OUTPATIENT)
Dept: PHYSICAL THERAPY | Age: 54
End: 2024-04-22
Payer: COMMERCIAL

## 2024-05-06 ENCOUNTER — HOSPITAL ENCOUNTER (OUTPATIENT)
Dept: PHYSICAL THERAPY | Age: 54
Setting detail: THERAPIES SERIES
Discharge: HOME | End: 2024-05-06
Payer: COMMERCIAL

## 2024-05-06 PROCEDURE — 97110 THERAPEUTIC EXERCISES: CPT

## 2024-05-06 NOTE — PROGRESS NOTES
[facility], [address], [address]  [place]  [address]  Phone: [phone]  Fax [phone]      Physical Therapy Daily Treatment Note    Date:  2024    Patient Name:  Michell Upton    :  1970  MRN: [id number]    Restrictions/Precautions:  universal   Allergies: Amoxicillin, Amoxicillin-pot clavulanate, and Clavulanic acid   Preferred Language for Healthcare:   [x] English       [] other:    Medical Diagnosis Information:  Diagnosis: R35.0 (ICD-10-CM) - Urinary frequency  R39.15 (ICD-10-CM) - Urinary urgency  N39.41 (ICD-10-CM) - Urge incontinence  Treatment Diagnosis:pelvic floor and core weakness       Insurance/Certification information:  PT Insurance Information: [employer]  Physician Information:  Celia Montalvo APRN - CNP  Plan of care signed (Y/N):  Y    Visit# / total visits:  3/12       Functional Outcome:          PFDI-20:133/300 (Lower score = Better function)         Time in:   7:35am      Timed Treatment: 25min Total Treatment Time: 25min  ________________________________________________________________________________________    Pain Level:    0/10  SUBJECTIVE:  Patient reports \"I am having a hard time doing the exercises, it is hard to find the time\".  Reports some compliance with HEP.     OBJECTIVE:     Exercise (TE) Resistance/Repetitions Other comments            PF strengthening        Short hold: (1sec) 10 times       Long hold: (3-5sec) 10 times     PF + hip ABD x10 W/ orange tband   PF + hip ADD x10        PF squat x10    Hook-lying TA Until achieved  TA only  TA + PF  March: x10  KFO:x10 *added today    Bridge     X10 *added today            HEP instruction: Written HEP instructions provided and reviewed  Access Code: [email or web address]  URL: [email or web address]  Date: 2024  Prepared by: Saige Lyon    Exercises  - Seated Pelvic Floor Contraction  - 3 x daily - 7 x weekly - 1 sets - 10 reps - 1sec.

## 2024-05-15 ENCOUNTER — OFFICE VISIT (OUTPATIENT)
Dept: UROGYNECOLOGY | Age: 54
End: 2024-05-15
Payer: COMMERCIAL

## 2024-05-15 VITALS
RESPIRATION RATE: 16 BRPM | HEART RATE: 76 BPM | TEMPERATURE: 98.2 F | SYSTOLIC BLOOD PRESSURE: 98 MMHG | OXYGEN SATURATION: 98 % | DIASTOLIC BLOOD PRESSURE: 63 MMHG

## 2024-05-15 DIAGNOSIS — R35.0 URINARY FREQUENCY: Primary | ICD-10-CM

## 2024-05-15 DIAGNOSIS — N39.41 URGE INCONTINENCE: ICD-10-CM

## 2024-05-15 DIAGNOSIS — R39.15 URINARY URGENCY: ICD-10-CM

## 2024-05-15 DIAGNOSIS — N95.2 VAGINAL ATROPHY: ICD-10-CM

## 2024-05-15 PROCEDURE — 99213 OFFICE O/P EST LOW 20 MIN: CPT | Performed by: NURSE PRACTITIONER

## 2024-05-15 RX ORDER — BISOPROLOL FUMARATE 5 MG/1
2.5 TABLET, FILM COATED ORAL NIGHTLY
COMMUNITY
Start: 2024-02-26

## 2024-05-15 NOTE — PROGRESS NOTES
5/15/2024       HPI:     Name: Michell Upton  YOB: 1970    CC: Patient is a 54 y.o. presenting for evaluation of urge incontinence .     HPI: How long have you had this problem?  years  Please rate the severity of your problem: mild  Anything make it better?    Taking Myrbetriq, doing well. Still wearing a pad, but minimal leakage. Using estrogen cream.  She admits to not being consistent with her PFPT exercises, does note they do help however  Pleased with vaginal estrogen cream     Ob/Gyn History:    OB History    Para Term  AB Living   3 3 3         SAB IAB Ectopic Molar Multiple Live Births                    # Outcome Date GA Lbr El/2nd Weight Sex Delivery Anes PTL Lv   3 Term  40w0d   F Vag-Spont      2 Term  41w0d   F Vag-Spont      1 Term  42w0d   F Vag-Spont        Past Medical History:   Past Medical History:   Diagnosis Date    Asthma     Chest pain     non cardiac    Depression     Heart disease     Hyperlipidemia     Hypertension     Localized osteoarthrosis, lower leg 2016    Postpartum depression     Prolonged emergence from general anesthesia     Pulmonary hypertension (HCC)     Stress incontinence     Wears glasses     for driving at night     Past Surgical History:   Past Surgical History:   Procedure Laterality Date    BACK SURGERY      lumbar & thoracic laminectomy    CHOLECYSTECTOMY      CYSTOSCOPY      DILATION AND CURETTAGE OF UTERUS N/A 2020    VIDEO HYSTEROSCOPY DILATATION AND CURETTAGE, POSSIBLE MYOSURE -JULIET=3- performed by Noelle Shaikh MD at [facility] OR    ENDOMETRIAL ABLATION  2014    HYSTERECTOMY (CERVIX STATUS UNKNOWN) N/A 2020    ROBOTIC ASSISTED LAPAROSCOPIC HYSTERECTOMY, BILATERAL SALPINGECTOMY, CYSTOSCOPY performed by Noelle Shaikh MD at [facility] OR    OTHER SURGICAL HISTORY  2020    robotic assisted laparoscopic hysterectomy, bilateral salpingectomy, cystoscopy    SKIN BIOPSY  2014    scalp

## 2024-05-20 ENCOUNTER — APPOINTMENT (OUTPATIENT)
Dept: PHYSICAL THERAPY | Age: 54
End: 2024-05-20
Payer: COMMERCIAL

## 2024-07-03 ENCOUNTER — APPOINTMENT (OUTPATIENT)
Dept: GENERAL RADIOLOGY | Age: 54
End: 2024-07-03
Payer: COMMERCIAL

## 2024-07-03 ENCOUNTER — HOSPITAL ENCOUNTER (EMERGENCY)
Age: 54
Discharge: HOME | End: 2024-07-03
Attending: EMERGENCY MEDICINE
Payer: COMMERCIAL

## 2024-07-03 VITALS
HEART RATE: 73 BPM | SYSTOLIC BLOOD PRESSURE: 140 MMHG | RESPIRATION RATE: 18 BRPM | TEMPERATURE: 98.9 F | OXYGEN SATURATION: 96 % | DIASTOLIC BLOOD PRESSURE: 70 MMHG | BODY MASS INDEX: 43.38 KG/M2 | WEIGHT: 241 LBS

## 2024-07-03 DIAGNOSIS — S91.331A PUNCTURE WOUND OF RIGHT FOOT, INITIAL ENCOUNTER: Primary | ICD-10-CM

## 2024-07-03 PROCEDURE — 73630 X-RAY EXAM OF FOOT: CPT

## 2024-07-03 PROCEDURE — 99283 EMERGENCY DEPT VISIT LOW MDM: CPT

## 2024-07-03 RX ORDER — DOXYCYCLINE HYCLATE 100 MG
100 TABLET ORAL 2 TIMES DAILY
Qty: 14 TABLET | Refills: 0 | Status: SHIPPED | OUTPATIENT
Start: 2024-07-03 | End: 2024-07-10

## 2024-07-03 RX ORDER — CIPROFLOXACIN 500 MG/1
500 TABLET, FILM COATED ORAL 2 TIMES DAILY
Qty: 14 TABLET | Refills: 0 | Status: SHIPPED | OUTPATIENT
Start: 2024-07-03 | End: 2024-07-10

## 2024-07-03 ASSESSMENT — PAIN SCALES - GENERAL: PAINLEVEL_OUTOF10: 5

## 2024-07-03 ASSESSMENT — PAIN - FUNCTIONAL ASSESSMENT: PAIN_FUNCTIONAL_ASSESSMENT: 0-10

## 2024-07-03 NOTE — ED PROVIDER NOTES
Emergency Department Attending Provider Note  Location: [facility]  ED  7/3/2024     Patient Identification  Michell Upton is a 54 y.o. female        HPI: as noted above            Emergency Department Provider Note  Location: [facility]  ED  7/3/2024     Patient Identification  Michell Upton is a 54 y.o. female    Chief Complaint  Puncture Wound (Stepped on a nail at lunch today, punctured bottom of right foot, nail is still in foot and shoe)          HPI  (History provided by patient)    Michell Upton was evaluated in the Emergency Department for puncture wound retained foreign body nail in the plantar aspect of the right foot.  Patient stepped on a nail today.  Presents with gym shoe on and nail penetrated through the sole of his shoe into the plantar aspect of the foot.  Denies numbness or weakness.  On aspirin but no anticoagulants.  Reports up-to-date on tetanus..       Nursing Notes were all reviewed and agreed with, or any disagreements were addressed in the HPI:  Allergies:   Allergies   Allergen Reactions    Amoxicillin     Amoxicillin-Pot Clavulanate Diarrhea    Clavulanic Acid        Past medical history:  has a past medical history of Asthma, Chest pain, Depression, Heart disease, Hyperlipidemia, Hypertension, Localized osteoarthrosis, lower leg (04/01/2016), Postpartum depression, Prolonged emergence from general anesthesia, Pulmonary hypertension (HCC), Stress incontinence, and Wears glasses.    Past surgical history:  has a past surgical history that includes Cholecystectomy (1997); Cystoscopy; skin biopsy (08/29/2014); Endometrial ablation (11/25/2014); back surgery (2008/2018); Dilation and curettage of uterus (N/A, 06/16/2020); other surgical history (08/24/2020); and Hysterectomy (N/A, 08/24/2020).    Home medications:   Prior to Admission medications    Medication Sig Start Date End Date Taking? Authorizing Provider   ciprofloxacin (CIPRO) 500 MG tablet Take 1 tablet

## 2024-12-13 ENCOUNTER — OFFICE VISIT (OUTPATIENT)
Dept: UROGYNECOLOGY | Age: 54
End: 2024-12-13
Payer: COMMERCIAL

## 2024-12-13 VITALS
HEART RATE: 67 BPM | DIASTOLIC BLOOD PRESSURE: 75 MMHG | SYSTOLIC BLOOD PRESSURE: 125 MMHG | RESPIRATION RATE: 18 BRPM | TEMPERATURE: 97.6 F | OXYGEN SATURATION: 100 %

## 2024-12-13 DIAGNOSIS — N95.2 VAGINAL ATROPHY: ICD-10-CM

## 2024-12-13 DIAGNOSIS — R35.0 URINARY FREQUENCY: Primary | ICD-10-CM

## 2024-12-13 DIAGNOSIS — N39.41 URGE INCONTINENCE: ICD-10-CM

## 2024-12-13 DIAGNOSIS — R39.15 URINARY URGENCY: ICD-10-CM

## 2024-12-13 PROCEDURE — 99213 OFFICE O/P EST LOW 20 MIN: CPT | Performed by: NURSE PRACTITIONER

## 2024-12-13 RX ORDER — SEMAGLUTIDE 0.25 MG/.5ML
0.25 INJECTION, SOLUTION SUBCUTANEOUS
COMMUNITY
Start: 2024-11-21

## 2024-12-13 RX ORDER — RANOLAZINE 1000 MG/1
1000 TABLET, EXTENDED RELEASE ORAL 2 TIMES DAILY
COMMUNITY
Start: 2024-11-11

## 2024-12-13 RX ORDER — MIRABEGRON 25 MG/1
25 TABLET, FILM COATED, EXTENDED RELEASE ORAL DAILY
Qty: 90 TABLET | Refills: 4 | Status: SHIPPED | OUTPATIENT
Start: 2024-12-13

## 2024-12-13 RX ORDER — EZETIMIBE 10 MG/1
10 TABLET ORAL DAILY
COMMUNITY
Start: 2024-11-11

## 2024-12-13 NOTE — PROGRESS NOTES
(EFFEXOR) 37.5 MG tablet Take 1 tablet by mouth daily      mirabegron (MYRBETRIQ) 25 MG TB24 Take 1 tablet by mouth daily (Patient not taking: Reported on 12/13/2024) 30 tablet 5     No current facility-administered medications for this visit.     Social History:   Social History     Socioeconomic History    Marital status:      Spouse name: Not on file    Number of children: Not on file    Years of education: Not on file    Highest education level: Not on file   Occupational History    Not on file   Tobacco Use    Smoking status: Never    Smokeless tobacco: Never   Vaping Use    Vaping status: Never Used   Substance and Sexual Activity    Alcohol use: No     Alcohol/week: 0.0 standard drinks of alcohol    Drug use: No    Sexual activity: Yes     Partners: Male   Other Topics Concern    Not on file   Social History Narrative    Not on file     Social Determinants of Health     Financial Resource Strain: Not on file   Food Insecurity: Unknown (1/21/2024)    Received from [facility], [facility]    Food Insecurities     Worried about running out of food: Not on file     Food Bought: Not on file   Transportation Needs: Unknown (1/21/2024)    Received from [facility], [facility]    Transportation     Worried about transportation: Not on file   Physical Activity: Not on file   Stress: Not on file   Social Connections: Not on file   Intimate Partner Violence: Unknown (1/21/2024)    Received from [facility], [facility]    Interpersonal Safety     Feel physically or emotionally unsafe where currently live: Not on file     Harm by anyone: Not on file     Emotionally Harmed: Not on file   Housing Stability: Unknown (1/21/2024)    Received from [facility], [facility]

## (undated) DEVICE — NEEDLE INSUF L150MM DIA2MM DISP FOR PNEUMOPERI ENDOPATH

## (undated) DEVICE — SHEET,DRAPE,53X77,STERILE: Brand: MEDLINE

## (undated) DEVICE — SUTURE ABSORBABLE MONOFILAMENT 0 CT1 6 IN VIO PDS + SXPP1B406

## (undated) DEVICE — SUTURE VCRL SZ 4-0 L18IN ABSRB UD L19MM PS-2 3/8 CIR PRIM J496H

## (undated) DEVICE — GLOVE SURG SZ 65 L12IN FNGR THK94MIL STD WHT LTX FREE

## (undated) DEVICE — BLADELESS OBTURATOR: Brand: WECK VISTA

## (undated) DEVICE — SUTURE VCRL SZ 0 L27IN ABSRB UD L26MM CT-2 1/2 CIR J270H

## (undated) DEVICE — PAD SANITARY VERSALON MATERNITY REG

## (undated) DEVICE — PVC URETHRAL CATHETER: Brand: DOVER

## (undated) DEVICE — SOLUTION BOWL: Brand: KENDALL

## (undated) DEVICE — GLOVE,SURG,SENSICARE,ALOE,LF,PF,7: Brand: MEDLINE

## (undated) DEVICE — CANNULA SEAL

## (undated) DEVICE — VESSEL SEALER EXTEND: Brand: ENDOWRIST

## (undated) DEVICE — SET IRRIG L94IN DIA0.281IN L BOR W/ 2 N VENT SPIK 2 ON/OFF

## (undated) DEVICE — PAD,NON-ADHERENT,3X8,STERILE,LF,1/PK: Brand: MEDLINE

## (undated) DEVICE — CATHETER IV 20GA L1.25IN PNK FEP SFTY STR HUB RADPQ DISP

## (undated) DEVICE — INVIEW CLEAR LEGGINGS: Brand: CONVERTORS

## (undated) DEVICE — SET ADMIN PRIMING 7ML L30IN 7.35LB 20 GTT 2ND RLER CLMP

## (undated) DEVICE — GOWN SIRUS NONREIN XL W/TWL: Brand: MEDLINE INDUSTRIES, INC.

## (undated) DEVICE — TOTAL TRAY, DB, 100% SILI FOLEY, 16FR 10: Brand: MEDLINE

## (undated) DEVICE — GLOVE SURG SZ 65 THK91MIL LTX FREE SYN POLYISOPRENE

## (undated) DEVICE — DEVICE TISS REM IU CANSTR VAC TB FT PEDAL DISPOSABLE MYOSURE

## (undated) DEVICE — SET FLD MGMT OUTFLO TB DISP FOR CTRL SYS AQUILEX

## (undated) DEVICE — PAD,SANITARY,11 IN,MAXI,W/WINGS,N-STRL: Brand: MEDLINE

## (undated) DEVICE — TRAY PREP DRY W/ PREM GLV 2 APPL 6 SPNG 2 UNDPD 1 OVERWRAP

## (undated) DEVICE — SOLUTION IV 1000ML LAC RINGERS PH 6.5 INJ USP VIAFLX PLAS

## (undated) DEVICE — Device

## (undated) DEVICE — TROCAR ENDOSCP L100MM DIA12MM BLDELSS OBT RADLUC STBL SL

## (undated) DEVICE — SET GRAV VENT NVENT CK VLV 3 NDL FREE PRT 10 GTT

## (undated) DEVICE — DRAPE,UNDERBUTTOCKS,PCH,STERILE: Brand: MEDLINE

## (undated) DEVICE — MINOR SET UP PK

## (undated) DEVICE — QUILTED PREMIUM COMFORT UNDERPAD,EXTRA HEAVY: Brand: WINGS

## (undated) DEVICE — ARM DRAPE

## (undated) DEVICE — SYSTEM FASCIAL CLSR UNIQUE SHLDED WNG SAFE UNIF CONSISTENT

## (undated) DEVICE — SPONGE LAP W18XL18IN WHT COT 4 PLY FLD STRUNG RADPQ DISP ST

## (undated) DEVICE — SET FLD MGMT CTRL SYS INFLO TB AQUILEX

## (undated) DEVICE — SYRINGE ANGIO 10ML COR CTRL FIX M LUER CONN

## (undated) DEVICE — 3M™ TEGADERM™ TRANSPARENT FILM DRESSING FRAME STYLE, 1624W, 2-3/8 IN X 2-3/4 IN (6 CM X 7 CM), 100/CT 4CT/CASE: Brand: 3M™ TEGADERM™

## (undated) DEVICE — PUMP SUC IRR TBNG L10FT W/ HNDPC ASSEMB STRYKEFLOW 2

## (undated) DEVICE — VCARE LARGE UTERINE MANIPULATOR: Brand: VCARE LARGE